# Patient Record
Sex: MALE | Race: WHITE | NOT HISPANIC OR LATINO | Employment: FULL TIME | URBAN - METROPOLITAN AREA
[De-identification: names, ages, dates, MRNs, and addresses within clinical notes are randomized per-mention and may not be internally consistent; named-entity substitution may affect disease eponyms.]

---

## 2024-07-02 ENCOUNTER — TELEPHONE (OUTPATIENT)
Dept: BEHAVIORAL HEALTH | Facility: CLINIC | Age: 54
End: 2024-07-02

## 2024-07-02 ENCOUNTER — HOSPITAL ENCOUNTER (EMERGENCY)
Facility: CLINIC | Age: 54
Discharge: HOME | End: 2024-07-05
Attending: EMERGENCY MEDICINE | Admitting: EMERGENCY MEDICINE
Payer: COMMERCIAL

## 2024-07-02 DIAGNOSIS — G47.00 INSOMNIA, UNSPECIFIED TYPE: Primary | ICD-10-CM

## 2024-07-02 DIAGNOSIS — F22 PARANOIA (H): ICD-10-CM

## 2024-07-02 DIAGNOSIS — F39 MOOD DISORDER (H): ICD-10-CM

## 2024-07-02 LAB
ALBUMIN SERPL BCG-MCNC: 4.8 G/DL (ref 3.5–5.2)
ALP SERPL-CCNC: 77 U/L (ref 40–150)
ALT SERPL W P-5'-P-CCNC: 23 U/L (ref 0–70)
AMPHETAMINES UR QL SCN: NORMAL
ANION GAP SERPL CALCULATED.3IONS-SCNC: 13 MMOL/L (ref 7–15)
AST SERPL W P-5'-P-CCNC: 24 U/L (ref 0–45)
BARBITURATES UR QL SCN: NORMAL
BASOPHILS # BLD AUTO: 0 10E3/UL (ref 0–0.2)
BASOPHILS NFR BLD AUTO: 0 %
BENZODIAZ UR QL SCN: NORMAL
BILIRUB SERPL-MCNC: 0.8 MG/DL
BUN SERPL-MCNC: 19.1 MG/DL (ref 6–20)
BZE UR QL SCN: NORMAL
CALCIUM SERPL-MCNC: 10.3 MG/DL (ref 8.6–10)
CANNABINOIDS UR QL SCN: NORMAL
CHLORIDE SERPL-SCNC: 101 MMOL/L (ref 98–107)
CREAT SERPL-MCNC: 0.95 MG/DL (ref 0.67–1.17)
DEPRECATED HCO3 PLAS-SCNC: 26 MMOL/L (ref 22–29)
EGFRCR SERPLBLD CKD-EPI 2021: >90 ML/MIN/1.73M2
EOSINOPHIL # BLD AUTO: 0.1 10E3/UL (ref 0–0.7)
EOSINOPHIL NFR BLD AUTO: 1 %
ERYTHROCYTE [DISTWIDTH] IN BLOOD BY AUTOMATED COUNT: 12.2 % (ref 10–15)
FENTANYL UR QL: NORMAL
GLUCOSE SERPL-MCNC: 96 MG/DL (ref 70–99)
HCT VFR BLD AUTO: 46.2 % (ref 40–53)
HGB BLD-MCNC: 16 G/DL (ref 13.3–17.7)
IMM GRANULOCYTES # BLD: 0.2 10E3/UL
IMM GRANULOCYTES NFR BLD: 1 %
LYMPHOCYTES # BLD AUTO: 1.8 10E3/UL (ref 0.8–5.3)
LYMPHOCYTES NFR BLD AUTO: 14 %
MCH RBC QN AUTO: 30 PG (ref 26.5–33)
MCHC RBC AUTO-ENTMCNC: 34.6 G/DL (ref 31.5–36.5)
MCV RBC AUTO: 87 FL (ref 78–100)
MONOCYTES # BLD AUTO: 1.1 10E3/UL (ref 0–1.3)
MONOCYTES NFR BLD AUTO: 9 %
NEUTROPHILS # BLD AUTO: 9.1 10E3/UL (ref 1.6–8.3)
NEUTROPHILS NFR BLD AUTO: 75 %
NRBC # BLD AUTO: 0 10E3/UL
NRBC BLD AUTO-RTO: 0 /100
OPIATES UR QL SCN: NORMAL
PCP QUAL URINE (ROCHE): NORMAL
PLATELET # BLD AUTO: 288 10E3/UL (ref 150–450)
POTASSIUM SERPL-SCNC: 4.3 MMOL/L (ref 3.4–5.3)
PROT SERPL-MCNC: 7.5 G/DL (ref 6.4–8.3)
RBC # BLD AUTO: 5.34 10E6/UL (ref 4.4–5.9)
SODIUM SERPL-SCNC: 140 MMOL/L (ref 135–145)
TSH SERPL DL<=0.005 MIU/L-ACNC: 3.72 UIU/ML (ref 0.3–4.2)
WBC # BLD AUTO: 12.2 10E3/UL (ref 4–11)

## 2024-07-02 PROCEDURE — 250N000013 HC RX MED GY IP 250 OP 250 PS 637: Performed by: EMERGENCY MEDICINE

## 2024-07-02 PROCEDURE — 82040 ASSAY OF SERUM ALBUMIN: CPT | Performed by: EMERGENCY MEDICINE

## 2024-07-02 PROCEDURE — 36415 COLL VENOUS BLD VENIPUNCTURE: CPT | Performed by: EMERGENCY MEDICINE

## 2024-07-02 PROCEDURE — 99285 EMERGENCY DEPT VISIT HI MDM: CPT | Performed by: EMERGENCY MEDICINE

## 2024-07-02 PROCEDURE — 85025 COMPLETE CBC W/AUTO DIFF WBC: CPT | Performed by: EMERGENCY MEDICINE

## 2024-07-02 PROCEDURE — 84443 ASSAY THYROID STIM HORMONE: CPT | Performed by: EMERGENCY MEDICINE

## 2024-07-02 PROCEDURE — 99284 EMERGENCY DEPT VISIT MOD MDM: CPT | Performed by: EMERGENCY MEDICINE

## 2024-07-02 PROCEDURE — 80307 DRUG TEST PRSMV CHEM ANLYZR: CPT | Performed by: EMERGENCY MEDICINE

## 2024-07-02 RX ORDER — OLANZAPINE 10 MG/1
10 TABLET, ORALLY DISINTEGRATING ORAL ONCE
Status: COMPLETED | OUTPATIENT
Start: 2024-07-02 | End: 2024-07-02

## 2024-07-02 RX ADMIN — OLANZAPINE 10 MG: 10 TABLET, ORALLY DISINTEGRATING ORAL at 23:01

## 2024-07-02 ASSESSMENT — ACTIVITIES OF DAILY LIVING (ADL)
ADLS_ACUITY_SCORE: 35
ADLS_ACUITY_SCORE: 35
ADLS_ACUITY_SCORE: 33

## 2024-07-02 ASSESSMENT — COLUMBIA-SUICIDE SEVERITY RATING SCALE - C-SSRS
6. HAVE YOU EVER DONE ANYTHING, STARTED TO DO ANYTHING, OR PREPARED TO DO ANYTHING TO END YOUR LIFE?: NO
2. HAVE YOU ACTUALLY HAD ANY THOUGHTS OF KILLING YOURSELF IN THE PAST MONTH?: NO
1. IN THE PAST MONTH, HAVE YOU WISHED YOU WERE DEAD OR WISHED YOU COULD GO TO SLEEP AND NOT WAKE UP?: NO

## 2024-07-03 ENCOUNTER — TELEPHONE (OUTPATIENT)
Dept: BEHAVIORAL HEALTH | Facility: CLINIC | Age: 54
End: 2024-07-03
Payer: COMMERCIAL

## 2024-07-03 PROCEDURE — 250N000013 HC RX MED GY IP 250 OP 250 PS 637: Performed by: EMERGENCY MEDICINE

## 2024-07-03 PROCEDURE — 250N000013 HC RX MED GY IP 250 OP 250 PS 637

## 2024-07-03 PROCEDURE — 99284 EMERGENCY DEPT VISIT MOD MDM: CPT

## 2024-07-03 RX ORDER — LORAZEPAM 1 MG/1
1 TABLET ORAL ONCE
Status: COMPLETED | OUTPATIENT
Start: 2024-07-03 | End: 2024-07-03

## 2024-07-03 RX ORDER — OLANZAPINE 10 MG/1
10 TABLET, ORALLY DISINTEGRATING ORAL 2 TIMES DAILY PRN
Status: DISCONTINUED | OUTPATIENT
Start: 2024-07-03 | End: 2024-07-03

## 2024-07-03 RX ORDER — ACETAMINOPHEN 325 MG/1
650 TABLET ORAL EVERY 4 HOURS PRN
Status: DISCONTINUED | OUTPATIENT
Start: 2024-07-03 | End: 2024-07-05 | Stop reason: HOSPADM

## 2024-07-03 RX ORDER — OLANZAPINE 10 MG/1
10 TABLET, ORALLY DISINTEGRATING ORAL AT BEDTIME
Status: DISCONTINUED | OUTPATIENT
Start: 2024-07-03 | End: 2024-07-05 | Stop reason: HOSPADM

## 2024-07-03 RX ORDER — QUETIAPINE FUMARATE 100 MG/1
100 TABLET, FILM COATED ORAL 2 TIMES DAILY
Status: DISCONTINUED | OUTPATIENT
Start: 2024-07-03 | End: 2024-07-03

## 2024-07-03 RX ORDER — IBUPROFEN 600 MG/1
600 TABLET, FILM COATED ORAL EVERY 6 HOURS PRN
Status: DISCONTINUED | OUTPATIENT
Start: 2024-07-03 | End: 2024-07-05 | Stop reason: HOSPADM

## 2024-07-03 RX ORDER — LORAZEPAM 2 MG/1
2 TABLET ORAL ONCE
Status: COMPLETED | OUTPATIENT
Start: 2024-07-03 | End: 2024-07-03

## 2024-07-03 RX ORDER — HYDROXYZINE HYDROCHLORIDE 25 MG/1
25-50 TABLET, FILM COATED ORAL EVERY 6 HOURS PRN
Status: DISCONTINUED | OUTPATIENT
Start: 2024-07-03 | End: 2024-07-05 | Stop reason: HOSPADM

## 2024-07-03 RX ORDER — OLANZAPINE 5 MG/1
5 TABLET ORAL AT BEDTIME
COMMUNITY
Start: 2024-07-02 | End: 2024-07-05

## 2024-07-03 RX ORDER — ZOLPIDEM TARTRATE 5 MG/1
10 TABLET ORAL AT BEDTIME
Status: DISCONTINUED | OUTPATIENT
Start: 2024-07-03 | End: 2024-07-05 | Stop reason: HOSPADM

## 2024-07-03 RX ORDER — ZOLPIDEM TARTRATE 10 MG/1
10 TABLET ORAL
COMMUNITY
Start: 2024-06-19 | End: 2024-07-05

## 2024-07-03 RX ORDER — OLANZAPINE 5 MG/1
5 TABLET, ORALLY DISINTEGRATING ORAL 2 TIMES DAILY
Status: DISCONTINUED | OUTPATIENT
Start: 2024-07-03 | End: 2024-07-03

## 2024-07-03 RX ADMIN — QUETIAPINE FUMARATE 100 MG: 100 TABLET ORAL at 10:56

## 2024-07-03 RX ADMIN — ZOLPIDEM TARTRATE 10 MG: 5 TABLET ORAL at 22:02

## 2024-07-03 RX ADMIN — OLANZAPINE 10 MG: 10 TABLET, ORALLY DISINTEGRATING ORAL at 22:02

## 2024-07-03 RX ADMIN — HYDROXYZINE HYDROCHLORIDE 50 MG: 25 TABLET ORAL at 09:17

## 2024-07-03 RX ADMIN — OLANZAPINE 5 MG: 5 TABLET, ORALLY DISINTEGRATING ORAL at 10:56

## 2024-07-03 RX ADMIN — LORAZEPAM 1 MG: 1 TABLET ORAL at 00:25

## 2024-07-03 RX ADMIN — LORAZEPAM 2 MG: 2 TABLET ORAL at 13:08

## 2024-07-03 NOTE — TELEPHONE ENCOUNTER
R:  [place]  Access Inpatient Bed Call Log 7/3/2024 7:28 AM   Intake has called facilities that have not updated their bed status within the last 12 hours.     ADULTS:     [facility] is posting 0 beds.               [facility] is posting 0 beds. [phone] Per call at 7:30am to APS no beds available.   [facility] is posting 0 beds. [phone]               [facility] is posting 0 beds. [phone]. Per call at 7:33am to [facility] low acuity beds for review.   [facility] is posting 2 beds. [phone]    [facility] (Ages 18-35) is posting 1 beds. Negative COVID test required, no recent or significant aggression, violence, or sexual assault. [phone] Per call at 7:34am to [facility], child, adol, and lucrecia beds are available for review. PT IS NOT AGE APPROPRIATE   [facility] is posting 0 beds. [phone]             [facility] is posting 0 beds. [phone]      [facility] is posting 0 beds. [phone]         Pt remains on work list pending appropriate bed availability.

## 2024-07-03 NOTE — TELEPHONE ENCOUNTER
R: [place]  Access Inpatient Bed Call Log  7/3/24 @ 1:00am   Intake has called facilities that have not updated their bed status within the last 12 hours.     *METRO:  [place] -- [facility]: @ capacity.  [facility]: @ cap per website. 1 ICU bed available. Reporting no reviews overnight.  [place] -- [facility]: @ cap per website. Low acuity  [place] -- [facility]: @ cap per website. Low acuity only.  [place] -- [facility]: @ cap per website.  [facility]: POSTING 2 BEDS.   [facility]: POSTING 1 BED. Ages 18-35, Voluntary only, NO aggression/physical/sexual assault, violence hx or drug abuse, or psychosis. Negative Covid.   [place] -- [facility]: @ cap per website.  [place] -- [facility]: @ cap per website.  [place] -- [facility]: @ cap per website. Do not review overnight.     Pt remains on waitlist pending appropriate placement availability

## 2024-07-03 NOTE — ED TRIAGE NOTES
"  Pt has been struggling with \" sleeping at night, has been pacing, increase anxiety\"   Triage Assessment (Adult)       Row Name 07/02/24 2041          Triage Assessment    Airway WDL WDL        Respiratory WDL    Respiratory WDL WDL        Skin Circulation/Temperature WDL    Skin Circulation/Temperature WDL WDL        Cardiac WDL    Cardiac WDL WDL        Peripheral/Neurovascular WDL    Peripheral Neurovascular WDL WDL        Cognitive/Neuro/Behavioral WDL    Cognitive/Neuro/Behavioral WDL WDL                     "

## 2024-07-03 NOTE — CONSULTS
Ryan Guerrero MRN# [id number]   Age: 53 year old YOB: 1970   Date of Admission to ED: 7/2/2024    In person visit Details:     Patient was assessed and interviewed face-to-face in person with this writer jack. Patient was observed to be able to participate in the assessment as evidenced by verbal consent. Assessment methods included conducting a formal interview with patient, review of medical records, collaboration with medical staff, and obtaining relevant collateral information from family and community providers when available.        Reason for Consult:   This note is being entered to supplement the psychiatry consultation note that was completed on July 02 2024 by the licensed mental health professional [facility]  have reviewed the pertinent clinical details related to their encounter. I am being consulted to offer additional guidance on psychiatric pharmacological interventions        I met with Jj in his room face-to-face by himself he was pleasant and cooperative during assessment and interview currently denied any suicidal ideation or homicidal ideation or self-injury behavior.  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  Jj told me recently he has been struggling to sleep provider put him on Remeron 15 mg increase it to 30 mg which is not effective also outpatient provider started him on trazodone 50 mg increased to 100 mg which is not effective, Jj told me he continues to have this racing thought process.  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  Jj told me his work is very stressful, he told me to manage his stress he daily exercise use yoga but recently nothing seems to work for his current distress and insomnia and anxiety.  Also Jj worried a lot about his  financial situation and nice family.  Jj met psychiatrist through telehealth from [place] which is started him, I asked Jj if he has family history of mental health from his dad or mom he told me only an anxiety and depression no history of bipolar or bipolar depression.  His TSH and his labs remarkable, no history of inpatient mental health unit or no history of substance use disorder Jj denied any visual or auditory hallucination.  jJ is  with Vanessa who I talked to our emergency room after I get the consent from Jj.  Also Jj has a daughter who is a registered nurse [employer] who joined her mom over the phone we discussed about medication benefit and side effect with family and they are free they agreed with Zyprexa 10 mg at bedtime.  Will continue Ambien 10 mg.  Both family and the patient aware of risk and benefit of Zyprexa such as weight gain and hyperlipidemia any EPS.  jJ currently would like to stay in the emergency room for another day since he does not like to be inpatient mental health unit even though his family wanted him to be inpatient mental health unit.  If Ryan want to leave the hospital please provide 30-day supply of Zyprexa 10 mg at bedtime.      There is genetic loading for none known.  Medical history does not appear to be significant.  Substance use does not appear to be playing a contributing role in the patient's presentation.  Patient appears to cope with stress/frustration/emotion by withdrawing.  Stressors include chronic mental health issues, school issues, peer issues, and family dynamics.   I have reviewed the nursing notes. I have reviewed the findings, diagnosis, plan and need for follow up with the patient.         HPI:        Per ED provider Dr. Arango note Ryan Guerrero is a 53 year old male who with no reported previous past medical history who presents to the emergency department today due to difficulty sleeping.  Patient presents  "with his wife who gives some of the history.  Apparently for the past 6 weeks he has had issues with sleeping to the point where he is only sleeping about 2 hours per night.  He is having difficulty falling asleep and staying asleep.  He is awake all night and pacing around.  This is very unusual for him.  He has tried multiple medications which were prescribed through his primary clinic as well as through his sleep specialist and has tried trazodone, mirtazapine, and is currently on 30 mg of Ambien at bedtime which has been ineffective for him.  Patient had an appointment with a psychiatrist via telehealth today and the psychiatrist prescribed another medication for him, unfortunately they do not know what the name of the medication is, he has not yet picked it up and has not yet started it (eventually it was discovered that this was zyprexa).  His wife reports that for the past week, he has gotten progressively worse and is deteriorating to the point where he cannot function.  He has lost about 15 pounds over the past few weeks due to not eating.  He reports severe anxiety, difficulty calming down, and racing thoughts.  He denies any SI, HI, or hallucinations.  Denies any alcohol or drug use.  He states that just prior to these symptoms starting several weeks ago he \"had a full checkup with my primary and all blood work was normal.\"     Patient lives in [place] normally, but again they recently purchased a second home in [place] and they are living here for the summer.  This has been somewhat stressful for him.  He also makes vague references to some family stressors and made reference to a \"bad business transaction with a family member that went south.\"  He does not give further details other than this.     Patient has had significant concern about his work.  He states that he is responsible for a certain project and has fallen behind on this and everybody else has finished theirs which is causing him " significant concern.  He has had significant difficulty concentrating which she is only exacerbating his inability to finish his project.  He is hyper fixated on this.     Evidently today during his telehealth psychiatry visit, this psychiatrist suggested to him that he take short-term disability/FMLA from work and take a medical leave due to his significantly debilitating symptoms.  Patient is very concerned about doing this as the patient does not want to miss work.     Patient came here from [place] today at the encouragement of his adult daughter who is a nurse.       Patient also is reporting that he had an episode of blood in stool today.  He has had issues with hard stools/constipation and did have to strain today.  He noticed a little bit of blood on top of the stool as well as blood on the paper.  He denies any rectal pain and has not had bleeding since that time.  He denies any abdominal pain, nausea, or vomiting.  He wonders if he could have a hemorrhoid that may be causing this for him.  Reports that he had one previous episode of this many years ago and has not had any issues since that time.     Patient denies fevers or chills, nasal congestion or sore throat, no cough.  He does have some anxiety and shortness of breath and sometimes some reflux/heartburn type symptoms.     This part of the medical record was transcribed by JANINE ORNELAS, Medical Scribe, from a dictation done by Pat Arango MD.       Pt has not required locked seclusion or restraints in the past 24 hours to maintain safety, please refer to RN documentation for further details.  Substance use does not appear to be playing a contributing role in the patient's presentation.  Brief Therapeutic Intervention(s):   Provided active listening, unconditional positive regard, and validation. Engaged in cognitive restructuring/ reframing, looked at common cognitive distortions and challenged negative thoughts. *Engaged in guided  discovery, explored patient's perspectives and helped expand them through socratic dialogue. Provided positive reinforcement for progress towards goals, gains in knowledge, and application of skills previously taught.  Engaged in social skills training. Explored and identified early warning signs to anger.        Past Psychiatric History:     None         Substance Use and History:     None         Past Medical History:   PAST MEDICAL HISTORY: No past medical history on file.    PAST SURGICAL HISTORY: No past surgical history on file.            Allergies:     Allergies   Allergen Reactions    Cats     Seasonal Allergies              Medications:   I have reviewed this patient's current medications  Current Facility-Administered Medications   Medication Dose Route Frequency Provider Last Rate Last Admin    acetaminophen (TYLENOL) tablet 650 mg  650 mg Oral Q4H PRN Pat Arango MD        hydrOXYzine HCl (ATARAX) tablet 25-50 mg  25-50 mg Oral Q6H PRN Pat Arango MD   50 mg at 07/03/24 0917    ibuprofen (ADVIL/MOTRIN) tablet 600 mg  600 mg Oral Q6H PRN Pat Arango MD        OLANZapine zydis (zyPREXA) ODT tab 10 mg  10 mg Oral At Bedtime Yung Santana MD        zolpidem (AMBIEN) tablet 10 mg  10 mg Oral At Bedtime Royal Perkins, APRN CNP         Current Outpatient Medications   Medication Sig Dispense Refill    OLANZapine (ZYPREXA) 5 MG tablet Take 5 mg by mouth at bedtime      zolpidem (AMBIEN) 10 MG tablet Take 10 mg by mouth nightly as needed for sleep                Family History:   FAMILY HISTORY: No family history on file.           Social History:   Upbringing: born and raised   Educational History:   Relationships:  Children: **   Current Living Situation:       - Collateral information from the famly/friend: none         PTA Medications:   (Not in a hospital admission)         Allergies:     Allergies   Allergen Reactions    Cats     Seasonal Allergies            Labs:     Recent Results (from the past 48 hour(s))   Urine Drug Screen Panel    Collection Time: 07/02/24  9:39 PM   Result Value Ref Range    Amphetamines Urine Screen Negative Screen Negative    Barbituates Urine Screen Negative Screen Negative    Benzodiazepine Urine Screen Negative Screen Negative    Cannabinoids Urine Screen Negative Screen Negative    Cocaine Urine Screen Negative Screen Negative    Fentanyl Qual Urine Screen Negative Screen Negative    Opiates Urine Screen Negative Screen Negative    PCP Urine Screen Negative Screen Negative   Comprehensive metabolic panel    Collection Time: 07/02/24  9:55 PM   Result Value Ref Range    Sodium 140 135 - 145 mmol/L    Potassium 4.3 3.4 - 5.3 mmol/L    Carbon Dioxide (CO2) 26 22 - 29 mmol/L    Anion Gap 13 7 - 15 mmol/L    Urea Nitrogen 19.1 6.0 - 20.0 mg/dL    Creatinine 0.95 0.67 - 1.17 mg/dL    GFR Estimate >90 >60 mL/min/1.73m2    Calcium 10.3 (H) 8.6 - 10.0 mg/dL    Chloride 101 98 - 107 mmol/L    Glucose 96 70 - 99 mg/dL    Alkaline Phosphatase 77 40 - 150 U/L    AST 24 0 - 45 U/L    ALT 23 0 - 70 U/L    Protein Total 7.5 6.4 - 8.3 g/dL    Albumin 4.8 3.5 - 5.2 g/dL    Bilirubin Total 0.8 <=1.2 mg/dL   TSH with free T4 reflex    Collection Time: 07/02/24  9:55 PM   Result Value Ref Range    TSH 3.72 0.30 - 4.20 uIU/mL   CBC with platelets and differential    Collection Time: 07/02/24  9:55 PM   Result Value Ref Range    WBC Count 12.2 (H) 4.0 - 11.0 10e3/uL    RBC Count 5.34 4.40 - 5.90 10e6/uL    Hemoglobin 16.0 13.3 - 17.7 g/dL    Hematocrit 46.2 40.0 - 53.0 %    MCV 87 78 - 100 fL    MCH 30.0 26.5 - 33.0 pg    MCHC 34.6 31.5 - 36.5 g/dL    RDW 12.2 10.0 - 15.0 %    Platelet Count 288 150 - 450 10e3/uL    % Neutrophils 75 %    % Lymphocytes 14 %    % Monocytes 9 %    % Eosinophils 1 %    % Basophils 0 %    % Immature Granulocytes 1 %    NRBCs per 100 WBC 0 <1 /100    Absolute Neutrophils 9.1 (H) 1.6 - 8.3 10e3/uL    Absolute Lymphocytes 1.8 0.8 - 5.3  "10e3/uL    Absolute Monocytes 1.1 0.0 - 1.3 10e3/uL    Absolute Eosinophils 0.1 0.0 - 0.7 10e3/uL    Absolute Basophils 0.0 0.0 - 0.2 10e3/uL    Absolute Immature Granulocytes 0.2 <=0.4 10e3/uL    Absolute NRBCs 0.0 10e3/uL          Physical and Psychiatric Examination:     BP (!) 138/100   Pulse 81   Temp 97.5  F (36.4  C) (Oral)   Resp 18   Ht 1.854 m (6' 1\")   Wt 78.2 kg (172 lb 4.8 oz)   SpO2 98%   BMI 22.73 kg/m    Weight is 172 lbs 4.8 oz  Body mass index is 22.73 kg/m .    Mental Status Exam:  Appearance: awake, alert  Attitude:  cooperative  Eye Contact:  good  Mood:  sad  and depressed  Affect:  appropriate and in normal range  Speech:  clear, coherent  Language: fluent and intact in English  Psychomotor, Gait, Musculoskeletal:  no evidence of tardive dyskinesia, dystonia, or tics  Thought Process:  logical, linear, and goal oriented  Associations:  no loose associations  Thought Content:  no evidence of suicidal ideation or homicidal ideation and no evidence of psychotic thought  Insight:  limited  Judgement:  limited  Oriented to:  time, person, and place  Attention Span and Concentration:  fair  Recent and Remote Memory:  intact  Fund of Knowledge:  appropriate         Diagnoses:      Mood disorder (H24)  Paranoia (H)         Recommendations:         1.Continue to recommend inpatient psychiatric hospitalizations for further stabilization   2.  Continue Ambien 10 mg, will start Zyprexa 10 mg at bedtime.  Continue hydroxyzine 25 to 50 mg as needed for an anxiety  3.  Consult psychiatry as needed  4.   Refer to psychiatric provider for medication management.    treatment per ED team    - Consulted with [facility]  licensed mental health professional, ED physician Dr. Yung Santana asked if they would like this writer to enter orders in the EHR,  patient's ED RN regarding this case.    Please call [facility] at [phone] if you have follow-up questions or wish to place another consult.  Royal" Gregorio, Psychiatric Nurse practitioner    Attestation:  Time with:  Patient:/Wife and daughter 45 minutes  Treatment Team: 30 Minutes  Chart Review: 40 minutes    Total time spent was 115 minutes. Over 50% of times was spent counseling and coordination of care.    I thank  primary ED provider and extended care team very much for letting me participate in the care of this patient.    I, Royal Perkins, FANTA, APRN, Psychiatric Nurse Practitioner have personally performed an examination of this patient.  I have edited the note to reflect all relevant changes.  I have discussed this patient with the care team on July 3, 2024.  I have reviewed all vitals and laboratory findings.    Disclaimer: This note consists of symbols derived from keyboarding,

## 2024-07-03 NOTE — ED NOTES
Pt Used bathroom once since 2300.  Pt resting calmly in room after Ativan.  Pt had eyes closed. No complaints noted.

## 2024-07-03 NOTE — ED PROVIDER NOTES
"    [facility] EMERGENCY DEPARTMENT ([facility])    7/02/24          History     Chief Complaint   Patient presents with    Panic Attack     Pt has been struggling with \" sleeping at night, has been pacing, increase anxiety\"    Rectal Bleeding     Noted \"fresh blood \" after defecating today\"     HPI  Ryan Guerrero is a 53 year old male who with no reported previous past medical history who presents to the emergency department today due to difficulty sleeping.  Patient presents with his wife who gives some of the history.  Apparently for the past 6 weeks he has had issues with sleeping to the point where he is only sleeping about 2 hours per night.  He is having difficulty falling asleep and staying asleep.  He is awake all night and pacing around.  This is very unusual for him.  He has tried multiple medications which were prescribed through his primary clinic as well as through his sleep specialist and has tried trazodone, mirtazapine, and is currently on 30 mg of Ambien at bedtime which has been ineffective for him.  Patient had an appointment with a psychiatrist via telehealth today and the psychiatrist prescribed another medication for him, unfortunately they do not know what the name of the medication is, he has not yet picked it up and has not yet started it (eventually it was discovered that this was zyprexa).  His wife reports that for the past week, he has gotten progressively worse and is deteriorating to the point where he cannot function.  He has lost about 15 pounds over the past few weeks due to not eating.  He reports severe anxiety, difficulty calming down, and racing thoughts.  He denies any SI, HI, or hallucinations.  Denies any alcohol or drug use.  He states that just prior to these symptoms starting several weeks ago he \"had a full checkup with my primary and all blood work was normal.\"    Patient lives in [place] normally, but again they recently purchased a second home in [place] and " "they are living here for the summer.  This has been somewhat stressful for him.  He also makes vague references to some family stressors and made reference to a \"bad business transaction with a family member that went south.\"  He does not give further details other than this.    Patient has had significant concern about his work.  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  He has had significant difficulty concentrating which she is only exacerbating his inability to finish his project.  He is hyper fixated on this.    Evidently today during his telehealth psychiatry visit, this psychiatrist suggested to him that he take short-term disability/FMLA from work and take a medical leave due to his significantly debilitating symptoms.  Patient is very concerned about doing this as the patient does not want to miss work.    Patient came here from [place] today at the encouragement of his adult daughter who is a nurse.      Patient also is reporting that he had an episode of blood in stool today.  He has had issues with hard stools/constipation and did have to strain today.  He noticed a little bit of blood on top of the stool as well as blood on the paper.  He denies any rectal pain and has not had bleeding since that time.  He denies any abdominal pain, nausea, or vomiting.  He wonders if he could have a hemorrhoid that may be causing this for him.  Reports that he had one previous episode of this many years ago and has not had any issues since that time.    Patient denies fevers or chills, nasal congestion or sore throat, no cough.  He does have some anxiety and shortness of breath and sometimes some reflux/heartburn type symptoms.    This part of the medical record was transcribed by JANINE ORNELAS, Medical Scribe, from a dictation done by Pat Arango MD.       Past Medical History  No " "past medical history on file.  No past surgical history on file.  Zolpidem Tartrate (AMBIEN PO)      Allergies   Allergen Reactions    Cats     Seasonal Allergies      Family History  No family history on file.  Social History       Past medical history, past surgical history, medications, allergies, family history, and social history were reviewed with the patient. No additional pertinent items.   A complete review of systems was performed with pertinent positives and negatives noted in the HPI, and all other systems negative.    Physical Exam   BP: (!) 167/96  Pulse: 60  Temp: 97.6  F (36.4  C)  Resp: 18  Height: 185.4 cm (6' 1\")  Weight: 78.2 kg (172 lb 4.8 oz)  SpO2: 99 %  Physical Exam  Vitals and nursing note reviewed.   Constitutional:       General: He is in acute distress.      Appearance: He is not diaphoretic.      Comments: Anxious thin adult male, constantly fidgeting and pacing, distressed   HENT:      Head: Atraumatic.      Mouth/Throat:      Mouth: Mucous membranes are moist.      Pharynx: Oropharynx is clear. No oropharyngeal exudate.   Eyes:      General: No scleral icterus.     Pupils: Pupils are equal, round, and reactive to light.   Cardiovascular:      Rate and Rhythm: Normal rate.      Pulses: Normal pulses.      Heart sounds: No murmur heard.  Pulmonary:      Effort: No respiratory distress.      Breath sounds: Normal breath sounds.   Abdominal:      General: Bowel sounds are normal.      Palpations: Abdomen is soft.      Tenderness: There is no abdominal tenderness.   Musculoskeletal:         General: No tenderness.   Skin:     General: Skin is warm.      Findings: No rash.   Neurological:      General: No focal deficit present.   Psychiatric:         Mood and Affect: Mood is anxious.         Speech: Speech is rapid and pressured.      Comments: Very anxious, pacing, fidgeting, +psychomotor agitation. Pressured speech. Normal volume of speech. Good eye contact. No SI/HI. Insight limited. " Seems fairly paranoid.  Remarkable only for minimal leukocytosis with a white count of 12.2, otherwise within normal limits.         ED Course, Procedures, & Data      Procedures               Results for orders placed or performed during the hospital encounter of 07/02/24   Urine Drug Screen Panel     Status: Normal   Result Value Ref Range    Amphetamines Urine Screen Negative Screen Negative    Barbituates Urine Screen Negative Screen Negative    Benzodiazepine Urine Screen Negative Screen Negative    Cannabinoids Urine Screen Negative Screen Negative    Cocaine Urine Screen Negative Screen Negative    Fentanyl Qual Urine Screen Negative Screen Negative    Opiates Urine Screen Negative Screen Negative    PCP Urine Screen Negative Screen Negative   Comprehensive metabolic panel     Status: Abnormal   Result Value Ref Range    Sodium 140 135 - 145 mmol/L    Potassium 4.3 3.4 - 5.3 mmol/L    Carbon Dioxide (CO2) 26 22 - 29 mmol/L    Anion Gap 13 7 - 15 mmol/L    Urea Nitrogen 19.1 6.0 - 20.0 mg/dL    Creatinine 0.95 0.67 - 1.17 mg/dL    GFR Estimate >90 >60 mL/min/1.73m2    Calcium 10.3 (H) 8.6 - 10.0 mg/dL    Chloride 101 98 - 107 mmol/L    Glucose 96 70 - 99 mg/dL    Alkaline Phosphatase 77 40 - 150 U/L    AST 24 0 - 45 U/L    ALT 23 0 - 70 U/L    Protein Total 7.5 6.4 - 8.3 g/dL    Albumin 4.8 3.5 - 5.2 g/dL    Bilirubin Total 0.8 <=1.2 mg/dL   TSH with free T4 reflex     Status: Normal   Result Value Ref Range    TSH 3.72 0.30 - 4.20 uIU/mL   CBC with platelets and differential     Status: Abnormal   Result Value Ref Range    WBC Count 12.2 (H) 4.0 - 11.0 10e3/uL    RBC Count 5.34 4.40 - 5.90 10e6/uL    Hemoglobin 16.0 13.3 - 17.7 g/dL    Hematocrit 46.2 40.0 - 53.0 %    MCV 87 78 - 100 fL    MCH 30.0 26.5 - 33.0 pg    MCHC 34.6 31.5 - 36.5 g/dL    RDW 12.2 10.0 - 15.0 %    Platelet Count 288 150 - 450 10e3/uL    % Neutrophils 75 %    % Lymphocytes 14 %    % Monocytes 9 %    % Eosinophils 1 %    % Basophils 0 %     % Immature Granulocytes 1 %    NRBCs per 100 WBC 0 <1 /100    Absolute Neutrophils 9.1 (H) 1.6 - 8.3 10e3/uL    Absolute Lymphocytes 1.8 0.8 - 5.3 10e3/uL    Absolute Monocytes 1.1 0.0 - 1.3 10e3/uL    Absolute Eosinophils 0.1 0.0 - 0.7 10e3/uL    Absolute Basophils 0.0 0.0 - 0.2 10e3/uL    Absolute Immature Granulocytes 0.2 <=0.4 10e3/uL    Absolute NRBCs 0.0 10e3/uL   Urine Drug Screen     Status: Normal    Narrative    The following orders were created for panel order Urine Drug Screen.  Procedure                               Abnormality         Status                     ---------                               -----------         ------                     Urine Drug Screen Panel[[id number]]      Normal              Final result                 Please view results for these tests on the individual orders.   CBC with Platelets & Differential     Status: Abnormal    Narrative    The following orders were created for panel order CBC with Platelets & Differential.  Procedure                               Abnormality         Status                     ---------                               -----------         ------                     CBC with platelets and d...[[id number]]  Abnormal            Final result                 Please view results for these tests on the individual orders.     Medications   OLANZapine zydis (zyPREXA) ODT tab 10 mg (10 mg Oral $Given 7/2/24 1051)     Labs Ordered and Resulted from Time of ED Arrival to Time of ED Departure   COMPREHENSIVE METABOLIC PANEL - Abnormal       Result Value    Sodium 140      Potassium 4.3      Carbon Dioxide (CO2) 26      Anion Gap 13      Urea Nitrogen 19.1      Creatinine 0.95      GFR Estimate >90      Calcium 10.3 (*)     Chloride 101      Glucose 96      Alkaline Phosphatase 77      AST 24      ALT 23      Protein Total 7.5      Albumin 4.8      Bilirubin Total 0.8     CBC WITH PLATELETS AND DIFFERENTIAL - Abnormal    WBC Count 12.2 (*)     RBC Count  5.34      Hemoglobin 16.0      Hematocrit 46.2      MCV 87      MCH 30.0      MCHC 34.6      RDW 12.2      Platelet Count 288      % Neutrophils 75      % Lymphocytes 14      % Monocytes 9      % Eosinophils 1      % Basophils 0      % Immature Granulocytes 1      NRBCs per 100 WBC 0      Absolute Neutrophils 9.1 (*)     Absolute Lymphocytes 1.8      Absolute Monocytes 1.1      Absolute Eosinophils 0.1      Absolute Basophils 0.0      Absolute Immature Granulocytes 0.2      Absolute NRBCs 0.0     URINE DRUG SCREEN PANEL - Normal    Amphetamines Urine Screen Negative      Barbituates Urine Screen Negative      Benzodiazepine Urine Screen Negative      Cannabinoids Urine Screen Negative      Cocaine Urine Screen Negative      Fentanyl Qual Urine Screen Negative      Opiates Urine Screen Negative      PCP Urine Screen Negative     TSH WITH FREE T4 REFLEX - Normal    TSH 3.72       No orders to display          Critical care was not performed.     Medical Decision Making  The patient's presentation was of high complexity (new to subacute undiagnosed problem causing significant debility).    The patient's evaluation involved:  an assessment requiring an independent historian (see separate area of note for details)  ordering and/or review of 3+ test(s) in this encounter (see separate area of note for details)  discussion of management or test interpretation with another health professional (see separate area of note for details)    The patient's management necessitated high risk (a decision regarding hospitalization).    Assessment & Plan    Patient presents with the above complaints.  On my evaluation, patient is alert, highly anxious, very fidgety, psychomotor agitation is evident.  He is cooperative, has normal eye contact, somewhat pressured speech.    I do have some suspicion for possible first presentation of bipolar disorder, though this is a little bit unusual in this age group, that can happen.  We did do basic  labs, CBC is remarkable for mild leukocytosis with a white count of 12.2, hemoglobin is 16.0, platelets are normal at 288, CMP within normal limits with the exception of minimal hypercalcemia with a calcium of 10.3, TSH within normal limits at 3.72, UDS is negative.  Suspect that the 1 episode of rectal bleeding that he had is probably due to hemorrhoids.  Labs are reassuring.      Patient was seen by the DEC , please see her note for full details.      I do believe that he would benefit from inpatient hospitalization for appropriate psychiatric evaluation, medication management.  Patient is significantly debilitated from his symptoms.  He was very reluctant to be admitted, but with his son and daughter here, they were able to convince him that they believe that this would be in his best interest.  Patient ultimately was agreeable to admission.  He is not holdable at this time, but I strongly encourage that he continue with the plan of care for admission.  He is agreeable to be [place], but does not want to consider admission to the [facility] region.  I have ordered Zyprexa for him which will hopefully help him sleep.  I have also placed orders for psychiatry consult in the morning.    I have reviewed the nursing notes. I have reviewed the findings, diagnosis, plan and need for follow up with the patient.    New Prescriptions    No medications on file       Final diagnoses:   Mood disorder (H24)   Paranoia (H)   IJANINE, am serving as a trained medical scribe to document services personally performed by Pat Arango MD, based on the provider's statements to me.     Pat MOORE MD, was physically present and have reviewed and verified the accuracy of this note documented by JANINE ORNELAS.     Pat Arango MD.   [facility] EMERGENCY DEPARTMENT  7/2/2024     Pat Arango MD  07/03/24 0020

## 2024-07-03 NOTE — MEDICATION SCRIBE - ADMISSION MEDICATION HISTORY
Medication Scribe Admission Medication History    Admission medication history is complete. The information provided in this note is only as accurate as the sources available at the time of the update.    Information Source(s): Patient, Hospital records, and CareEverywhere/SureScripts via in-person    Pertinent Information: None.    Changes made to PTA medication list:  Added: Olanzapine 5 mg at bedtime PRN.  Deleted: None  Changed: None    Allergies reviewed with patient and updates made in EHR: yes    Medication History Completed By: Paulie Hurd MD 7/3/2024 11:05 AM    PTA Med List   Medication Sig Last Dose    OLANZapine (ZYPREXA) 5 MG tablet Take 5 mg by mouth at bedtime 7/2/2024 at hs    zolpidem (AMBIEN) 10 MG tablet Take 10 mg by mouth nightly as needed for sleep Past Week at hs

## 2024-07-03 NOTE — PLAN OF CARE
Ryan Guerrero  July 2, 2024  Plan of Care Hand-off Note     Patient Care Path: inpatient mental health    Plan for Care:     It is the recommendation of this clinician that pt admit to IP MH for safety and stabilization. 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 The ED MD and family prefer MH admission.  Patient is voluntary, but fearful and paranoid about it.  He's afraid to be away from work, as well. Pt is unable to engage in safety planning to mitigate risk level in a non-secure setting. Lower levels of care have not been successful in mitigating risk. Due to this IP is the least restrictive option of care for pt. Pt should remain in IP until deemed safe to return to the community and engage in OP MH supports. Pt will be able to resume work with established providers upon discharge.      Identified Goals and Safety Issues: Patient's mental health has been increasingly decompensating.  He will benefit from assistance with sleep and possible cognitive behavioral tools, as well as; further assessment, observation, and stabilization.  He is not aggressive.    Overview:  Steph Guerrero, spouse, [phone] Ugo Guerrero, daughter, [phone]      Legal Status: Legal Status at Admission: Voluntary/Patient has signed consent for treatment    Psychiatry Consult:  Yes       Updated   regarding plan of care.           Rosanne Haji, [facility] Hospital[facility]

## 2024-07-03 NOTE — TELEPHONE ENCOUNTER
S: [facility] , DEC  Rosanne  calling at 11:10pm about a 53 year old/Male presenting with concerns of onset Bipolar and hypomania.      B: Pt arrived via Family. Presenting problem, stressors: Pt has been sleeping 2-4 hrs daily for the past 6 weeks.  He has increase in anxiety and depression.  He lost 15-20lbs in last 6 weeks.     Pt affect in ED: Anxious  and Paranoid, not aggressive. Mild agitation.   Pt Dx: Major Depressive Disorder and Generalized Anxiety Disorder  Previous IPMH hx? No  Pt denies SI   Hx of suicide attempt? No  Pt denies SIB  Pt denies HI   Pt denies hallucinations .   Pt RARS Score: 3    Hx of aggression/violence, sexual offenses, legal concerns, Epic care plan? describe: No  Current concerns for aggression this visit? No  Does pt have a history of Civil Commitment? No  Is Pt their own guardian? Yes    Pt is prescribed medication. Is patient medication compliant? Yes and not sure- he has been fearful of taking them.  Concern of not taking meds.  Recently started trial of sleep meds.   Pt endorses OP services: Psychiatrist  CD concerns: None  Acute or chronic medical concerns: No  Does Pt present with specific needs, assistive devices, or exclusionary criteria? None      Pt is ambulatory  Pt is able to perform ADLs independently      A: Pt to be reviewed for [facility] admission. Pt is Voluntary  [facility]: [facility]    COVID Symptoms: No  If yes, COVID test required   Utox: Negative   CMP: WNL  CBC: WNL  HCG: N/A    R: Patient cleared and ready for behavioral bed placement: Yes  Pt placed on IP worklist? Yes    Does Patient need a Transfer Center request created? No, Pt is located within [facility] ED, [facility] ED, or [place] ED

## 2024-07-03 NOTE — CONSULTS
"Diagnostic Evaluation Consultation  Crisis Assessment    Patient Name: Ryan Guerrero  Age:  53 year old  Legal Sex: male  Gender Identity: male  Pronouns:   Race: White  Ethnicity: Not  or   Language: English      Patient was assessed: In person   Crisis Assessment Start Date: 07/02/24  Crisis Assessment Start Time: 2200  Crisis Assessment Stop Time: 2230  Patient location: [facility] EMERGENCY DEPARTMENT                             ED05    Referral Data and Chief Complaint  Ryan Guerrero presents to the ED with family/friends. Patient is presenting to the ED for the following concerns: Paranoia, Depression, Anxiety, Worsening psychosocial stress, Health stressors.   Factors that make the mental health crisis life threatening or complex are:  Patient was brought in by his wife, his adult daughter (who is a nurse), and his adult son due to high anxiety, insomnia, increasing depression, and paranoia.  He has been decompensating, over the last days.  He's alert and oriented x 5.  He's highly anxious but cooperative, with some redirection.  He's not aggressive.  He denied SI, NSSI, and HI.  He denied prior suicide attempts.  He denied having hallucinations and/or delusional thoughts.  He denied excessive spending, high risk behavior, gambling, and hypersexuality.  He is hyper-fixated on what he sees as a work failure and conflict with family about bad business.  He said, \"there's a work project.  Everyone else has finished their portion, but me.\"  He's lost 15-20 pounds in the last couple months.  He's been sleeping 2-4 hours per night; despite being prescribed Trazodone, then Mirtazipine, and then Ambien.  Those doses were doubled, after prescribed, yet he still has not slept much.  They were prescribed by a primary doctor and a sleep doctor.  Today, he talked to a Tele-Psychiatrist who prescribed Olanzapine.  He has a follow up visit with him in one week.  He's worried that the " Psychiatrist will drop him.  He's been in a panic, pacing, fidgety, shaky, loss of sleep, loss of memory, withdrawal, loss of interest, and not engaging in usual activities.  He was encouraged to take a medical or mental health leave and to go on short-term disability, but he could not perform the tasks necessary in order to accomplish this.  His insight, judgment, and impulse control were impaired.    Informed Consent and Assessment Methods  Explained the crisis assessment process, including applicable information disclosures and limits to confidentiality, assessed understanding of the process, and obtained consent to proceed with the assessment.  Assessment methods included conducting a formal interview with patient, review of medical records, collaboration with medical staff, and obtaining relevant collateral information from family and community providers when available.  : done     Patient response to interventions: acceptance expressed  Coping skills were attempted to reduce the crisis:  Pt has been seeking help from outpatient providers, but his symptoms continue to increase     History of the Crisis   Patient denied prior mental health diagnosis.  He has a Depression diagnosis on his record from [place].  He has not been in the ED for MH concerns, before.  He has not been admitted to an inpatient MH unit, before.  He has not sought out mental health providers, prior to this mental health crisis.  He's his own guardian.  He's not on civil commitment.    Brief Psychosocial History  Family:  , Children yes (Adult son and adult daughter)  Support System:  Wife, Children  Employment Status:  employed full-time  Source of Income:  salary/wages  Financial Environmental Concerns:  other (see comments) (increasing concerns about finances)  Current Hobbies:  family functions, exercise/fitness, meditation, outdoor activities, interaction with pets  Barriers in Personal Life:  mental health concerns  Patient  "lives with his wife of 30 years in [place].  They recently bought a cabin in [place] and they've been staying there for the last 6 weeks.  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  He has a bachelors in finance.  He named the following coping skills and things he's enjoyed:  exercise, meditation, time outdoors, walking the dog, and time with his children.    Significant Clinical History  Current Anxiety Symptoms:  panic attack, obsessions/compulsions, racing thoughts, excessive worry, shortness of breath or racing heart, anxious  Current Depression/Trauma:  avoidance, sense of doom, difficulty concentrating, withdrawl/isolation, negativistic, crying or feels like crying, impaired decision making, helplessness, hopelessness, sadness, excessive guilt  Current Somatic Symptoms:  sweating, flushing, shaking, somatic symptoms (abdominal pain, headache, tension), racing thoughts, excessive worry, shortness of breath or racing heart, anxious  Current Psychosis/Thought Disturbance:  impulsive, inattentive, hyperactive, agitation, distractability  Current Eating Symptoms:  loss of appetite, recent weight loss  Chemical Use History:  Alcohol: None  Benzodiazepines: None  Opiates: None  Cocaine: None  Marijuana: None  Other Use: None   Past diagnosis:  Depression, Anxiety Disorder  Family history:  No known history of mental health or chemical health concerns  Past treatment:  Individual therapy, Primary Care, Psychiatric Medication Management  Details of most recent treatment:  Patient talked to an \"MSN counselor/therapist\" 2-3 times in the last 3 weeks and he just talked to a Psychiatrist, today.  Other relevant history:  Patient lives in [place] with his wife, daughter, and son.  They recently purchased a cabin in [place], [place].  His daughter preferred to have him come to [place] for MH concerns, rather than go to an ED in [facility].     Collateral " "Information  Is there collateral information:       Collateral information name, relationship, phone number:   Ugo Guerrero, daughter, [phone]    What happened today: Pt wanted daughter to stay in room and daughter said pt has been a poor self-report because of his mental health decompensation. He's over-fixated on work and fear that the company is failing because of him.  He spent 15 hours on the computer related to work, but he didn't get anything accomplished.        What is different about patient's functioning:  \"This is not my dad, at all.  We're all so worried about him.\"   Pt has been getting worse and worse, over the last 6 weeks.      Concern about alcohol/drug use:  No    What do you think the patient needs:  admission    Has patient made comments about wanting to kill themselves/others:  No    If d/c is recommended, can they take part in safety/aftercare planning:  Yes     Additional collateral information:        Risk Assessment  [place] Suicide Severity Rating Scale Full Clinical Version:  Suicidal Ideation  Q1 Wish to be Dead (Lifetime): No  Q2 Non-Specific Active Suicidal Thoughts (Lifetime): No  Q6 Suicide Behavior (Lifetime): no     Suicidal Behavior (Lifetime)  Actual Attempt (Lifetime): No  Has subject engaged in non-suicidal self-injurious behavior? (Lifetime): No  Interrupted Attempts (Lifetime): No  Aborted or Self-Interrupted Attempt (Lifetime): No  Preparatory Acts or Behavior (Lifetime): No    [place] Suicide Severity Rating Scale Recent:   Suicidal Ideation (Recent)  Q1 Wished to be Dead (Past Month): no  Q2 Suicidal Thoughts (Past Month): no  Level of Risk per Screen: no risks indicated  Intensity of Ideation (Recent)  Description of Most Severe Ideation (Past 1 Month): \"I'm not suicidal.\"  Suicidal Behavior (Recent)  Actual Attempt (Past 3 Months): No  Has subject engaged in non-suicidal self-injurious behavior? (Past 3 Months): No  Interrupted Attempts (Past 3 Months): " No  Aborted or Self-Interrupted Attempt (Past 3 Months): No  Preparatory Acts or Behavior (Past 3 Months): No    Environmental or Psychosocial Events: challenging interpersonal relationships, other life stressors, helplessness/hopelessness, work or task failure  Protective Factors: Protective Factors: strong bond to family unit, community support, or employment, intact marriage or domestic partnership, responsibilities and duties to others, including pets and children, lives in a responsibly safe and stable environment, help seeking    Does the patient have thoughts of harming others? Feels Like Hurting Others: no  Previous Attempt to Hurt Others: no  Is the patient engaging in sexually inappropriate behavior?: no    Is the patient engaging in sexually inappropriate behavior?  no        Mental Status Exam   Affect: Flat, Constricted  Appearance: Appropriate  Attention Span/Concentration: Attentive, Inattentive  Eye Contact: Engaged, Intense    Fund of Knowledge: Appropriate   Language /Speech Content: Fluent  Language /Speech Volume: Normal, Soft  Language /Speech Rate/Productions: Minimally Responsive, Pressured, Normal  Recent Memory: Variable  Remote Memory: Variable  Mood: Anxious, Depressed (Patient appears to be fearful)  Orientation to Person: Yes   Orientation to Place: Yes  Orientation to Time of Day: Yes  Orientation to Date: Yes     Situation (Do they understand why they are here?): Yes  Psychomotor Behavior: Hyperactive  Thought Content: Paranoia  Thought Form: Goal Directed, Paranoia, Obsessive/Perseverative     Medication  Psychotropic medications: Olanzapine  Medication Orders - Psychiatric (From admission, onward)      None             Current Care Team  Patient Care Team:  System, Provider Not In as PCP - General (Clinic)    Diagnosis  Patient Active Problem List   Diagnosis Code    Paranoia (H) F22    Mood disorder (H24) F39       Primary Problem This Admission  Active Hospital Problems     *Paranoia (H)      Mood disorder (H24)        Clinical Summary and Substantiation of Recommendations      It is the recommendation of this clinician that pt admit to IP MH for safety and stabilization. 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 Pt is unable to engage in safety planning to mitigate risk level in a non-secure setting. Lower levels of care have not been successful in mitigating risk. Due to this IP is the least restrictive option of care for pt. Pt should remain in IP until deemed safe to return to the community and engage in [facility] supports. Pt will be able to resume work with established providers upon discharge.         Severe psychiatric, behavioral or other comorbid conditions are appropriate for management at inpatient mental health as indicated by at least one of the following: Psychiatric Symptoms, Impaired impulse control, judgement, or insight, Symptoms of impact to function  Severe dysfunction in daily living is present as indicated by at least one of the following: Extreme deterioration in social interactions  Situation and expectations are appropriate for inpatient care: Patient management/treatment at lower level of care is not feasible or is inappropriate  Inpatient mental health services are necessary to meet patient needs and at least one of the following: Specific condition related to admission diagnosis is present and judged likely to deteriorate in absence of treatment at proposed level of care.    Patient coping skills attempted to reduce the crisis:  Pt has been seeking help from outpatient providers, but his symptoms continue to increase    Disposition  Recommended disposition: Inpatient Mental Health        Reviewed case and recommendations with attending provider. Attending Name: Pat Arango MD        Attending concurs with disposition: yes (Dr Arango preferred admission)       Patient and/or validated legal guardian concurs with disposition:   yes (reluctant but voluntary, especially due to adult childrens' preference (especially daughter who is a nurse))       Final disposition:  inpatient mental health    Legal status on admission: Voluntary/Patient has signed consent for treatment    Assessment Details   Total duration spent with the patient: 30 min     CPT code(s) utilized: 86839 - Psychotherapy for Crisis - 60 (30-74*) min    Rosanne Haji [facility], Psychotherapist  [facility] - Triage & Transition Services  Callback: [phone]

## 2024-07-04 ENCOUNTER — TELEPHONE (OUTPATIENT)
Dept: BEHAVIORAL HEALTH | Facility: CLINIC | Age: 54
End: 2024-07-04
Payer: COMMERCIAL

## 2024-07-04 PROCEDURE — 250N000013 HC RX MED GY IP 250 OP 250 PS 637: Performed by: EMERGENCY MEDICINE

## 2024-07-04 PROCEDURE — 250N000013 HC RX MED GY IP 250 OP 250 PS 637

## 2024-07-04 PROCEDURE — 99207 PR NO CHARGE LOS: CPT | Performed by: CLINICAL NURSE SPECIALIST

## 2024-07-04 PROCEDURE — 250N000013 HC RX MED GY IP 250 OP 250 PS 637: Performed by: CLINICAL NURSE SPECIALIST

## 2024-07-04 RX ORDER — CLONIDINE HYDROCHLORIDE 0.1 MG/1
0.1 TABLET, EXTENDED RELEASE ORAL 2 TIMES DAILY
Status: DISCONTINUED | OUTPATIENT
Start: 2024-07-04 | End: 2024-07-05 | Stop reason: HOSPADM

## 2024-07-04 RX ADMIN — HYDROXYZINE HYDROCHLORIDE 50 MG: 25 TABLET ORAL at 17:44

## 2024-07-04 RX ADMIN — CLONIDINE 0.1 MG: 0.1 TABLET, EXTENDED RELEASE ORAL at 19:34

## 2024-07-04 RX ADMIN — ZOLPIDEM TARTRATE 10 MG: 5 TABLET ORAL at 22:06

## 2024-07-04 RX ADMIN — OLANZAPINE 10 MG: 10 TABLET, ORALLY DISINTEGRATING ORAL at 22:06

## 2024-07-04 NOTE — ED NOTES
IP MH Referral Acuity Rating Score (RARS)    LMHP complete at referral to IP MH, with DEC; and, daily while awaiting IP MH placement. Call score to PPS.  CRITERIA SCORING   New 71 HH and Involuntary for IP MH (not adolescent) 0/1   Boarding over 24 hours 1/1   Vulnerable adult at least 55+ with multiple co morbidities; or, Patient age 11 or under 0/1   Suicide ideation without relief of precipitating factors 0/1   Current plan for suicide 0/1   Current plan for homicide 0/1   Imminent risk or actual attempt to seriously harm another without relief of factors precipitating the attempt 0/1   Severe dysfunction in daily living (ex: complete neglect for self care, extreme disruption in vegetative function, extreme deterioration in social interactions) 1/1   Recent (last 2 weeks) or current physical aggression in the ED 0/1   Restraints or seclusion episode in ED 0/1   Verbal aggression, agitation, yelling, etc., while in the ED 0/1   Active psychosis with psychomotor agitation or catatonia 0/1   Need for constant or near constant redirection (from leaving, from others, etc).  0/1   Intrusive or disruptive behaviors 0/1   TOTAL Acuity Total Score: 2

## 2024-07-04 NOTE — PROGRESS NOTES
Triage & Transition Services, Extended Care     Client Name: Ryan Guerrero    Date: July 4, 2024    Service Type: (P) excused from group session  Session Start Time:  (P) 1300    Session End Time: (P) 1300  Session Length: (P) 0  Site Location: [facility] EMERGENCY DEPARTMENT                             [id number]  Total Number ofAttendees: (P) 0  Topic: (P) relaxation techniques   Response: (P)  (Patient had a visitor present during group time.)     JUAN LUIS Dennis   Licensed Mental Health Professional (LMHP), [facility]  [phone]

## 2024-07-04 NOTE — TELEPHONE ENCOUNTER
R:  [place]  Access Inpatient Bed Call Log 7/4/2024 AT 7:45 AM   Intake has called facilities that have not updated their bed status within the last 12 hours.   -METRO  ADULTS:   [facility] is posting 0 beds.               [facility] is posting 0 beds. [phone]   [facility] is posting 0 beds. [phone]               [facility] is posting 0 beds. [phone].   [facility] is posting 2 beds. [phone] Per call at 8:47 am to [place] on DELAY.  [facility] (Ages 18-35) is posting 4 beds. Negative COVID test required, no recent or significant aggression, violence, or sexual assault. [phone] PT NOT APPROPRIATE D/T UNIT RESTRICTIONS.  [facility] is posting 0 beds. [phone]             [facility] is posting 0 beds. [phone]      [facility] through [facility] is posting 0 beds. [phone]         Pt remains on work list pending appropriate bed availability.

## 2024-07-04 NOTE — PROGRESS NOTES
"Triage & Transition Services, Extended Care     Therapy Progress Note    Patient: Ryan goes by \"Ryan,\" uses he/him pronouns  Date of Service: July 4, 2024  Site of Service: [facility] EMERGENCY DEPARTMENT                             [id number]  Patient was seen yes  Mode of Assessment: Virtual: AmWell    Presentation Summary: Pt is seen by extended care for therapeutic check-in and reassessment. Exchanged greeting, introduced self and role. Pt presents as axious, cooperative, alert, oriented x4. Affect and eye contact are appropriate. Thought processes are organized and logical. Pt denies SI, HI, and AVH's. Pt expresses concerns that he has been in the ER for two days and wonders what the plan or next step is. He says he has been able to sleep but overall is not feeling rested due to being in the ER environment. He is feeling like he would be able to sleep more outside of the hospital. 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 He endorses having racing thoughts which are difficult to manage. He describes being \"stir crazy\" because he has been unable to walk around. Pt and his sister are wondering when he is discharged if the hospital staff are able to make referrals to psychiatry and therapy in [place]. States Pt is from [facility] and will be returning there soon.    Therapeutic Intervention(s) Provided: Engaged in safety planning, Engaged in cognitive restructuring/ reframing, looked at common cognitive distortions and challenged negative thoughts., Reviewed healthy living that supports positive mental health, including looking at sleep hygiene, regular movement, nutrition, and regular socialization.    Current Symptoms: racing thoughts, excessive worry, anxious, shortness of breath or racing heart impaired decision making, difficulty concentrating racing thoughts, excessive worry, anxious, shortness of breath or racing heart inattentive      Mental " Status Exam   Affect: Blunted  Appearance: Appropriate  Attention Span/Concentration: Attentive  Eye Contact: Engaged    Fund of Knowledge: Appropriate   Language /Speech Content: Fluent  Language /Speech Volume: Normal  Language /Speech Rate/Productions: Normal  Recent Memory: Intact  Remote Memory: Intact  Mood: Anxious  Orientation to Person: Yes   Orientation to Place: Yes  Orientation to Time of Day: Yes  Orientation to Date: Yes     Situation (Do they understand why they are here?): Yes  Psychomotor Behavior: Normal  Thought Content: Clear  Thought Form: Obsessive/Perseverative, Goal Directed    Treatment Objective(s) Addressed: rapport building, identifying and practicing coping strategies, processing feelings, safety planning, assessing safety    Patient Response to Interventions: acceptance expressed, verbalizes understanding    Progress Towards Goals: Patient Reports Symptoms Are: ongoing  Patient Progress Toward Goals: is making progress  Comment: Patient reports he was able to sleep approximately 7 hours overnight, has been able to nap a bit today.  Continues to experience high levels of anxiety, does report some improvement with lorazepam, does feel somewhat drugged with current dose., has also met with psychiatry today and will start olanzapine with Ambien tonight for sleep.  Next Step to Work Toward Discharge: symptom stabilization  Symptom Stabilization Comment: Reduction of anxiety, improved sleep,    Case Management:      Plan: inpatient mental health  yes RN, provider Consulted with Manuel Villanueva regarding the plan to continue with IP. Pt is not considered to be holdable and is free to leave if he decides.  no    Clinical Substantiation: Pt denies SI, HI, and AVH's.  He continues to experience severe anxiety with paranoia. He has been able to sleep and reports that medications are helping. After therapeutic assessment, intervention and aftercare planning by ED care team and [facility] and in  consultation with the attending provider, the patient's circumstances and mental state were appropriate for inpatient behavioral health. Patient is recommended by this clinician to admit IP MH for safety and stabilization. Pt exhibits impaired ability to social functioning with decreased insight and judgement. Pt is not considered to be holdable and is free to leave if he chooses. He could benefit from assistance with outpatient resources for therapy and psychiatry.    Legal Status: Legal Status at Admission: Voluntary/Patient has signed consent for treatment    Session Status: Time session started: 1031  Time session ended: 1055  Session Duration (minutes): 24 minutes  Session Number: 2  Anticipated number of sessions or this episode of care: 4    Time Spent: 24 minutes    CPT Code: CPT Codes: 88246 - Psychotherapy (with patient) - 30 (16-37*) min    Diagnosis:   Patient Active Problem List   Diagnosis Code    Paranoia (H) F22    Mood disorder (H24) F39       Primary Problem This Admission: Active Hospital Problems    Paranoia (H)      *Mood disorder (H24)    F39    Elton Rodas, [facility]   Licensed Mental Health Professional (LMHP), [facility]  [phone]

## 2024-07-04 NOTE — ED NOTES
Patient educated about the importance of psychotherapy and outpatient follow-up care. Patient is reluctant and anxious about an in-patient stay and engages in planning for follow-up with primary psych care providers in [place] post hospital visit @ [facility].

## 2024-07-04 NOTE — ED PROVIDER NOTES
[facility] ED Mental Health Handoff Note:       Brief HPI:  This is a 53 year old male signed out to me by Dr. Don.  See initial ED Provider note for full details of the presentation.    Home meds reviewed and ordered/administered: Yes    Medically stable for inpatient mental health admission: Yes.    Evaluated by mental health: Yes. The recommendation is for inpatient mental health treatment. Bed search in process    Safety concerns: At the time I received sign out, there were no safety concerns.    Hold Status:  Active Orders   N/A         Exam:   Patient Vitals for the past 24 hrs:   BP Temp Temp src Pulse Resp SpO2   07/04/24 0815 (!) 144/93 98  F (36.7  C) Oral 68 16 100 %   07/03/24 2033 134/78 -- -- 60 -- 100 %         ED Course:    Medications   acetaminophen (TYLENOL) tablet 650 mg (has no administration in time range)   ibuprofen (ADVIL/MOTRIN) tablet 600 mg (has no administration in time range)   hydrOXYzine HCl (ATARAX) tablet 25-50 mg (50 mg Oral $Given 7/3/24 0917)   OLANZapine zydis (zyPREXA) ODT tab 10 mg (10 mg Oral $Given 7/3/24 2202)   zolpidem (AMBIEN) tablet 10 mg (10 mg Oral $Given 7/3/24 2202)   OLANZapine zydis (zyPREXA) ODT tab 10 mg (10 mg Oral $Given 7/2/24 2301)   LORazepam (ATIVAN) tablet 1 mg (1 mg Oral $Given 7/3/24 0025)   LORazepam (ATIVAN) tablet 2 mg (2 mg Oral $Given 7/3/24 1308)            There were no significant events during my shift.        Impression:    ICD-10-CM    1. Mood disorder (H24)  F39       2. Paranoia (H)  F22           Plan:    Awaiting inpatient mental health admission/transfer.      RESULTS:   No results found for this visit on 07/02/24 (from the past 24 hour(s)).          MD Kay MCCLELLAN Andrew R, MD  07/04/24 8324

## 2024-07-04 NOTE — TELEPHONE ENCOUNTER
R: [place]  Access Inpatient Bed Call Log  7/4/2024 12:02 AM  Intake has called facilities that have not updated their bed status within the last 12 hours.??      ADULTS:     *METRO  [place] -- [facility]: @ cap per website.  [place] -- [facility]:  @ Cap per website.    [place] -- [facility]: @ Cap per website.  [place] -- [facility]: @ Cap per website.   [place] -- [facility]: @ Cap per website.  [facility] -- [facility]: @ Cap per website.   [place] -- [facility]/YA beds @ Posting 4 beds. Ages 18-28, Voluntary only, COVID test req'd, NO aggression, physical or sexual assault, violence hx or drug abuse, or psychosis   [place] -- [facility]: @ Cap per website.  [place] -- [facility]: @ cap per website.  [place] -- [facility]:  @ Cap per website.      Pt remains on waitlist pending appropriate placement availability.

## 2024-07-04 NOTE — PROGRESS NOTES
"Triage & Transition Services, Extended Care     Therapy Progress Note    Patient: Ryan goes by \"Ryan,\" uses he/him pronouns  Date of Service: July 3, 2024  Site of Service: [facility] EMERGENCY DEPARTMENT                             ED05  Patient was seen yes  Mode of Assessment:      Presentation Summary: Patient is cooperative, engages easily in iPad visit with this writer.  Continues to present with high levels of anxiety, reports that he has been compliant with medications that have been offered, and has been taking lorazepam today.  Feels as though it may be too high of a dose, is experiencing some foggy or thinking, feels like his speech is less clear.  Does report improvement specific to anxiety, not feeling as if his heart is racing, slowing racing thoughts as well.  Patient was able to sleep overnight, believes he got approximately 7 hours.  Additionally was able to nap some today.  Reports a willingness to come inpatient, however also wants to consider possible continued observation with new medication and possible discharge tomorrow afternoon.  Patient's wife and adult children are supportive and engaged, or working with patient to help him understand current programming and changes.  Able to engage in identifying some safety planning, and coping skills.  Patient and spouse report that patient has experienced some lack of strength due to recent weight loss, also feeling coordination is off, is quick to tire.    Therapeutic Intervention(s) Provided: Engaged in safety planning, Engaged in cognitive restructuring/ reframing, looked at common cognitive distortions and challenged negative thoughts., Reviewed healthy living that supports positive mental health, including looking at sleep hygiene, regular movement, nutrition, and regular socialization.    Current Symptoms: racing thoughts, excessive worry, anxious, shortness of breath or racing heart impaired decision making, difficulty " concentrating racing thoughts, excessive worry, anxious, shortness of breath or racing heart inattentive      Mental Status Exam   Affect: Constricted  Appearance: Appropriate  Attention Span/Concentration: Attentive  Eye Contact: Engaged    Fund of Knowledge: Appropriate   Language /Speech Content: Fluent  Language /Speech Volume: Normal  Language /Speech Rate/Productions: Pressured, Normal  Recent Memory: Variable  Remote Memory: Variable  Mood: Anxious, Depressed  Orientation to Person: Yes   Orientation to Place: Yes  Orientation to Time of Day: Yes  Orientation to Date: Yes     Situation (Do they understand why they are here?): Yes  Psychomotor Behavior: Other (please comment) (Agitated, pacing, restless)  Thought Content: Paranoia  Thought Form: Obsessive/Perseverative, Goal Directed    Treatment Objective(s) Addressed: rapport building, identifying and practicing coping strategies, processing feelings, safety planning, assessing safety    Patient Response to Interventions: acceptance expressed, verbalizes understanding    Progress Towards Goals: Patient Reports Symptoms Are: ongoing  Patient Progress Toward Goals: is making progress  Comment: Patient reports he was able to sleep approximately 7 hours overnight, has been able to nap a bit today.  Continues to experience high levels of anxiety, does report some improvement with lorazepam, does feel somewhat drugged with current dose., has also met with psychiatry today and will start olanzapine with Ambien tonight for sleep.  Next Step to Work Toward Discharge: symptom stabilization  Symptom Stabilization Comment: Reduction of anxiety, improved sleep,    Case Management:      Plan: inpatient mental health  yes RN, provider    yes    Clinical Substantiation: Patient presented to the emergency department due to significant increase in anxiety, ongoing [id number] even with medication, difficulty addressing day-to-day responsibilities, feeling as if he  is letting his team at work down as he is not able to perform.  Patient remains appropriate for inpatient hospitalization, is agreeable to remaining in the emergency department overnight and into late afternoon tomorrow, will be starting new medication regimen today.  Patient remains on the inpatient placement list, is voluntary.  Patient is not reporting SI, HI, SIB and is not presenting as a danger to himself or others.  Patient is not holdable if he were to elect to discharge.    Legal Status: Legal Status at Admission: Voluntary/Patient has signed consent for treatment    Session Status: Time session started: 0416  Time session ended: 0432  Session Duration (minutes): 16 minutes  Session Number: 1  Anticipated number of sessions or this episode of care: 4    Time Spent: 16 minutes    CPT Code: CPT Codes: 28899 - Psychotherapy (with patient) - 30 (16-37*) min    Diagnosis:   Patient Active Problem List   Diagnosis Code    Paranoia (H) F22    Mood disorder (H24) F39       Primary Problem This Admission: Active Hospital Problems    *Paranoia (H)      Mood disorder (H24)        [facility]   Licensed Mental Health Professional (LMHP), [facility]  [phone]

## 2024-07-04 NOTE — PROGRESS NOTES
Patient's RN in the [facility] said that the patient and his sister are both asking for a re-evaluation urgently so that he can discharge with a prescription for a medication that will resolve the sleep issue that appears to have been ongoing for at least 6 weeks. Reviewed his chart - he has tried mirtazapine 15 and 30 mg, trazodone 100 mg, and zolpidem 30 mg. Mirtazapine is more sedating at lower doses, but it does not appear that he has tried 7.5 mg. It does not appear that he has tried higher doses of trazodone. Patient is reported to be highly anxious.    BP is elevated at 152/87. With the elevated BP, severe anxiety, and insomnia, I recommended clonidine extended release (Kapvay) 0.1 mg BID.      Will hold off on adding any more medications at this time and see how he does tonight with the addition of Kapvay. He is already on 3 CNS depressants. It appears that he did sleep for 7 hours last night.     Other options: mirtazapine 7.5 mg OR doxepin 3 mg OR discontinue Zyprexa and switch to Seroquel OR amitriptyline OR discontinue Ambien and switch to Ramelteon.    Obtain baseline EKG  Consult sleep medicine.      ADELIA Garrison, CNP  Emergency Psychiatry  7/4/2024

## 2024-07-04 NOTE — TELEPHONE ENCOUNTER
R: [place]  Access Inpatient Bed Call Log 7/3/2024 11:15 PM   Intake has called facilities that have not updated their bed status within the last 12 hours.??         [facility]   [facility]: @ cap per website.   [facility] -- [facility]:  Posting 0 beds. [phone] ECT Tx offered. Per previous calls, patients need to go through APS.   [facility] -- [facility]: Posting 0 beds. ECT Tx offered.        [facility] -- [facility]:  Posting 0 beds.   [facility] -- [facility]: Posting 0 beds. [phone] ECT Tx offered.   [place] -- [facility] 4 YA beds. Pt must be 16-28 Pt is not appropriate.  [place] -- [facility] Posting 0 beds. [phone]     [facility] -- [facility]: @ cap per website.   [place] -- [facility]:  Posting 0 beds. Low acuity only. [phone]      Pt remains on worklist pending appropriate bed availability.

## 2024-07-04 NOTE — ED NOTES
Patient not agreeable to unit routine at [facility] and complains of anxiety. Patient given lunch and is watching television and reading the paper at this time.

## 2024-07-04 NOTE — ED NOTES
Bed: BEC02R  Expected date: 7/4/24  Expected time:   Means of arrival:   Comments:  ROSSI ALMEIDA

## 2024-07-05 ENCOUNTER — TELEPHONE (OUTPATIENT)
Dept: BEHAVIORAL HEALTH | Facility: CLINIC | Age: 54
End: 2024-07-05
Payer: COMMERCIAL

## 2024-07-05 VITALS
RESPIRATION RATE: 18 BRPM | WEIGHT: 172.3 LBS | DIASTOLIC BLOOD PRESSURE: 78 MMHG | TEMPERATURE: 97.6 F | SYSTOLIC BLOOD PRESSURE: 113 MMHG | HEART RATE: 60 BPM | BODY MASS INDEX: 22.83 KG/M2 | OXYGEN SATURATION: 99 % | HEIGHT: 73 IN

## 2024-07-05 PROCEDURE — 250N000013 HC RX MED GY IP 250 OP 250 PS 637: Performed by: CLINICAL NURSE SPECIALIST

## 2024-07-05 PROCEDURE — 99284 EMERGENCY DEPT VISIT MOD MDM: CPT

## 2024-07-05 RX ORDER — HYDROXYZINE PAMOATE 50 MG/1
50 CAPSULE ORAL 3 TIMES DAILY PRN
Qty: 30 CAPSULE | Refills: 0 | Status: SHIPPED | OUTPATIENT
Start: 2024-07-05

## 2024-07-05 RX ORDER — ZOLPIDEM TARTRATE 10 MG/1
10 TABLET ORAL AT BEDTIME
Qty: 15 TABLET | Refills: 0 | Status: SHIPPED | OUTPATIENT
Start: 2024-07-05

## 2024-07-05 RX ORDER — CLONIDINE HYDROCHLORIDE 0.1 MG/1
0.1 TABLET, EXTENDED RELEASE ORAL 2 TIMES DAILY
Qty: 30 TABLET | Refills: 0 | Status: SHIPPED | OUTPATIENT
Start: 2024-07-05

## 2024-07-05 RX ORDER — OLANZAPINE 10 MG/1
10 TABLET ORAL AT BEDTIME
Qty: 15 TABLET | Refills: 0 | Status: SHIPPED | OUTPATIENT
Start: 2024-07-05

## 2024-07-05 RX ADMIN — CLONIDINE 0.1 MG: 0.1 TABLET, EXTENDED RELEASE ORAL at 08:23

## 2024-07-05 ASSESSMENT — ACTIVITIES OF DAILY LIVING (ADL)
ADLS_ACUITY_SCORE: 35

## 2024-07-05 ASSESSMENT — COLUMBIA-SUICIDE SEVERITY RATING SCALE - C-SSRS
1. SINCE LAST CONTACT, HAVE YOU WISHED YOU WERE DEAD OR WISHED YOU COULD GO TO SLEEP AND NOT WAKE UP?: NO
SUICIDE, SINCE LAST CONTACT: NO
TOTAL  NUMBER OF INTERRUPTED ATTEMPTS SINCE LAST CONTACT: NO
ATTEMPT SINCE LAST CONTACT: NO
TOTAL  NUMBER OF ABORTED OR SELF INTERRUPTED ATTEMPTS SINCE LAST CONTACT: NO
2. HAVE YOU ACTUALLY HAD ANY THOUGHTS OF KILLING YOURSELF?: NO
6. HAVE YOU EVER DONE ANYTHING, STARTED TO DO ANYTHING, OR PREPARED TO DO ANYTHING TO END YOUR LIFE?: NO

## 2024-07-05 NOTE — CONSULTS
Ryan Guerrero MRN# [id number]   Age: 53 year old YOB: 1970   Date of Admission to ED: 7/2/2024    In person visit Details:     Patient was assessed and interviewed face-to-face in person with this writer jack. Patient was observed to be able to participate in the assessment as evidenced by verbal consent. Assessment methods included conducting a formal interview with patient, review of medical records, collaboration with medical staff, and obtaining relevant collateral information from family and community providers when available.        Reason for Consult:   This note is being entered to supplement the psychiatry consultation note that was completed on July 02 2024 by the licensed mental health professional Rosanne Haji, [facility]  have reviewed the pertinent clinical details related to their encounter. I am being consulted to offer additional guidance on psychiatric pharmacological interventions     I met Jj in his room face-to-face by himself he is was very pleasant and cooperative during assessment and interview currently denied any suicidal ideation or homicidal ideation or self-injury behavior.  Per nurses notes Jj has been sleeping very well, also Jj told me he is sleep better than before but he rather sleep in his own bedroom and uses by his room.  Jj started on Zyprexa 10 mg while staying in the emergency room, continue Ambien 10 mg.  Also we started Jj on clonidine 0.1 mg twice a day for his current anxiety.  Currently Jj have some tremor can be from side effect of Zyprexa explained to the family both his wife and his sister was at bedside wanted to take him to [place] today.  I refilled his medication Zyprexa 10 mg Ambien 10 milligrams daily and clonidine 0.1 mg was given risk and benefit of all this medication was explained to Jj and his family.  Specifically I explained to all the family about risk and the benefit of Zyprexa.  Family told me  Jj will have psychiatry to follow[place] in [place] also he will follow-up with outpatient partial hospitalization when he from today.  Also I gave Jj 50 mg hydroxyzine as needed for an anxiety.  I explained to both sister and his wife Jj needed  more psychotherapy to manage his current distress.      There is genetic loading for none known.  Medical history does not appear to be significant.  Substance use does not appear to be playing a contributing role in the patient's presentation.  Patient appears to cope with stress/frustration/emotion by withdrawing.  Stressors include chronic mental health issues, school issues, peer issues, and family dynamics.      I have reviewed the nursing notes. I have reviewed the findings, diagnosis, plan and need for follow up with the patient.         HPI:      Per ED provider Dr. Arango note Ryan Guerrero is a 53 year old male who with no reported previous past medical history who presents to the emergency department today due to difficulty sleeping.  Patient presents with his wife who gives some of the history.  Apparently for the past 6 weeks he has had issues with sleeping to the point where he is only sleeping about 2 hours per night.  He is having difficulty falling asleep and staying asleep.  He is awake all night and pacing around.  This is very unusual for him.  He has tried multiple medications which were prescribed through his primary clinic as well as through his sleep specialist and has tried trazodone, mirtazapine, and is currently on 30 mg of Ambien at bedtime which has been ineffective for him.  Patient had an appointment with a psychiatrist via telehealth today and the psychiatrist prescribed another medication for him, unfortunately they do not know what the name of the medication is, he has not yet picked it up and has not yet started it (eventually it was discovered that this was zyprexa).  His wife reports that for the past week, he has  "gotten progressively worse and is deteriorating to the point where he cannot function.  He has lost about 15 pounds over the past few weeks due to not eating.  He reports severe anxiety, difficulty calming down, and racing thoughts.  He denies any SI, HI, or hallucinations.  Denies any alcohol or drug use.  He states that just prior to these symptoms starting several weeks ago he \"had a full checkup with my primary and all blood work was normal.\"     Patient lives in [place] normally, but again they recently purchased a second home in [place] and they are living here for the summer.  This has been somewhat stressful for him.  He also makes vague references to some family stressors and made reference to a \"bad business transaction with a family member that went south.\"  He does not give further details other than this.     Patient has had significant concern about his work.  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  He has had significant difficulty concentrating which she is only exacerbating his inability to finish his project.  He is hyper fixated on this.     Evidently today during his telehealth psychiatry visit, this psychiatrist suggested to him that he take short-term disability/FMLA from work and take a medical leave due to his significantly debilitating symptoms.  Patient is very concerned about doing this as the patient does not want to miss work.     Patient came here from [place] today at the encouragement of his adult daughter who is a nurse.       Patient also is reporting that he had an episode of blood in stool today.  He has had issues with hard stools/constipation and did have to strain today.  He noticed a little bit of blood on top of the stool as well as blood on the paper.  He denies any rectal pain and has not had bleeding since that time.  He denies any abdominal " pain, nausea, or vomiting.  He wonders if he could have a hemorrhoid that may be causing this for him.  Reports that he had one previous episode of this many years ago and has not had any issues since that time.     Patient denies fevers or chills, nasal congestion or sore throat, no cough.  He does have some anxiety and shortness of breath and sometimes some reflux/heartburn type symptoms.     This part of the medical record was transcribed by JANINE ORNELAS, Medical Scribe, from a dictation done by Pat Arango MD.         Pt has not required locked seclusion or restraints in the past 24 hours to maintain safety, please refer to RN documentation for further details.  Substance use does not appear to be playing a contributing role in the patient's presentation.  Brief Therapeutic Intervention(s):   Provided active listening, unconditional positive regard, and validation. Engaged in cognitive restructuring/ reframing, looked at common cognitive distortions and challenged negative thoughts. Engaged in guided discovery, explored patient's perspectives and helped expand them through socratic dialogue. Provided positive reinforcement for progress towards goals, gains in knowledge, and application of skills previously taught.  Engaged in social skills training. Explored and identified early warning signs to anger.        Past Psychiatric History:     See DEC  note        Substance Use and History:     See DEC  note         Past Medical History:   PAST MEDICAL HISTORY: No past medical history on file.    PAST SURGICAL HISTORY: No past surgical history on file.            Allergies:     Allergies   Allergen Reactions    Cats     Seasonal Allergies              Medications:   I have reviewed this patient's current medications  Current Facility-Administered Medications   Medication Dose Route Frequency Provider Last Rate Last Admin    acetaminophen (TYLENOL) tablet 650 mg  650 mg Oral Q4H SHE Arango  "Pat Enriquez MD        CloNIDine ER (KAPVAY) 12 hr tablet TB12 0.1 mg  0.1 mg Oral BID Tran Hamilton APRN CNP   0.1 mg at 07/05/24 0823    hydrOXYzine HCl (ATARAX) tablet 25-50 mg  25-50 mg Oral Q6H PRN Pat Arango MD   50 mg at 07/04/24 1744    ibuprofen (ADVIL/MOTRIN) tablet 600 mg  600 mg Oral Q6H PRN Pat Arango MD        OLANZapine zydis (zyPREXA) ODT tab 10 mg  10 mg Oral At Bedtime Yung Santana MD   10 mg at 07/04/24 2206    zolpidem (AMBIEN) tablet 10 mg  10 mg Oral At Bedtime Royal Perkins APRN CNP   10 mg at 07/04/24 2206     Current Outpatient Medications   Medication Sig Dispense Refill    CloNIDine ER (KAPVAY) 0.1 MG 12 hr tablet Take 1 tablet (0.1 mg) by mouth 2 times daily 30 tablet 0    hydrOXYzine (VISTARIL) 50 MG capsule Take 1 capsule (50 mg) by mouth 3 times daily as needed for itching 30 capsule 0    OLANZapine (ZYPREXA) 10 MG tablet Take 1 tablet (10 mg) by mouth at bedtime 15 tablet 0    zolpidem (AMBIEN) 10 MG tablet Take 1 tablet (10 mg) by mouth at bedtime 15 tablet 0              Family History:   FAMILY HISTORY: No family history on file.           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       - Collateral information from the famly/friend: none         PTA Medications:   (Not in a hospital admission)         Allergies:     Allergies   Allergen Reactions    Cats     Seasonal Allergies           Labs:   No results found for this or any previous visit (from the past 48 hour(s)).       Physical and Psychiatric Examination:     BP (!) 151/93   Pulse 60   Temp 97.6  F (36.4  C) (Oral)   Resp 16   Ht 1.854 m (6' 1\")   Wt 78.2 kg (172 lb 4.8 oz)   SpO2 99%   BMI 22.73 kg/m    Weight is 172 lbs 4.8 oz  Body mass index is 22.73 kg/m .    Mental Status Exam:  Appearance: awake, alert  Attitude:  cooperative  Eye Contact:  good  Mood:  anxious, sad , and depressed  Affect:  appropriate " and in normal range  Speech:  clear, coherent  Language: fluent and intact in English  Psychomotor, Gait, Musculoskeletal:  no evidence of tardive dyskinesia, dystonia, or tics  Thought Process:  logical, linear, and goal oriented  Associations:  no loose associations  Thought Content:  no evidence of suicidal ideation or homicidal ideation  Insight:  good  Judgement:  intact  Oriented to:  time, person, and place  Attention Span and Concentration:  intact  Recent and Remote Memory:  intact  Fund of Knowledge:  appropriate         Diagnoses:      Mood disorder (H24)  Paranoia (H)  Insomnia, unspecified type         Recommendations:         1.  Discharge back to home with family patient will follow up with in [place] with partial hospitalization and therapy and psychiatry  2.  Zyprexa 10 mg, Ambien 10 mg, clonidine 0.1 mg twice a day hydroxyzine 50 mg every 8 hours  4.  Consult psychiatry as needed  4.   Refer to psychiatric provider for medication management. *   treatment per ED team    - [facility]  licensed mental health professional, ED physician Dr. Key asked if they would like this writer to enter orders in the EHR,  patient's ED RN regarding this case.    Please call [facility]/DEC at [phone] if you have follow-up questions or wish to place another consult.  Royal Perkins, Psychiatric Nurse practitioner    Attestation:  Time with:  Patient:/Family  30 minutes  Treatment Team: 30 Minutes  Chart Review: 30 minutes    Total time spent was 90 minutes. Over 50% of times was spent counseling and coordination of care.    I thank  primary ED provider and extended care team very much for letting me participate in the care of this patient.    I, Royal Perkins, FANTA, APRN, Psychiatric Nurse Practitioner have personally performed an examination of this patient.  I have edited the note to reflect all relevant changes.  I have discussed this patient with the care team July 5, 2024.  I have  reviewed all vitals and laboratory findings.    Disclaimer: This note consists of symbols derived from keyboarding,

## 2024-07-05 NOTE — ED NOTES
Patient was anxious but calm and cooperative.ATARAX 50MG PRN was given for anxiety.Patient was medications compliant with a lot of teaching by this writer.Patient denied SI/HI. His family was here visiting.Patient is resting at this moment.Will continue to monitor.

## 2024-07-05 NOTE — PROGRESS NOTES
"Triage and Transition Services Extended Care Reassessment     Patient: Ryan goes by \"Ryan,\" uses he/him pronouns  Date of Service: July 5, 2024  Site of Service: [facility] EMERGENCY DEPARTMENT                             [id number]  Patient was seen yes  Mode of Assessment: In person     Reason for Reassessment: other (see comment) (anxiety)    History of Patient's Original Emergency Room Encounter: Patient denied prior mental health diagnosis.  He has a Depression diagnosis on his record from [place].    Current Patient Presentation: This writer introduced self and role to patient who was sitting in his room reading the newspaper.  Patient presented as engaged, cooperative, oriented x4, and able to engage in safety planning.  Patient disclosed that his mental health crisis began 6 - 8 weeks ago when he began struggling with worry, racing thoughts and insomnia.  He stated that the lack of sleep precipitated his anxiety, paranoia and weight loss.  He denied ever attempted suicide or engaging in SIB.  Patient has never been hospitalized for mental health and has a psychiatrist in [place].  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  He currently is on a leave from work to focus on his mental health.    Presentation Summary: Pt is seen by this clinician for a reassessment since the ED [place] seemed to intensify his anxiety due to the noises.   Pt presented as axious, cooperative, alert, oriented x4.   Affect and eye contact are appropriate. Thought processes are organized and logical. Pt denies SI, HI, and AVH's. Patient reported that he has had difficulty resting and sleeping in ED, and requested to discharge home to [place] where he resides.    Changes Observed Since Initial Assessment: decrease in presenting symptoms    Therapeutic Interventions Provided: Engaged in safety planning, Engaged in cognitive restructuring/ reframing, looked at " common cognitive distortions and challenged negative thoughts., Reviewed healthy living that supports positive mental health, including looking at sleep hygiene, regular movement, nutrition, and regular socialization.    Current Symptoms: racing thoughts, excessive worry, anxious, shortness of breath or racing heart impaired decision making, difficulty concentrating racing thoughts, excessive worry, anxious, shortness of breath or racing heart distractability loss of appetite    Mental Status Exam   Affect: Appropriate  Appearance: Appropriate  Attention Span/Concentration: Attentive  Eye Contact: Engaged    Fund of Knowledge: Advanced   Language /Speech Content: Fluent  Language /Speech Volume: Normal  Language /Speech Rate/Productions: Normal  Recent Memory: Intact  Remote Memory: Intact  Mood: Anxious  Orientation to Person: Yes   Orientation to Place: Yes  Orientation to Time of Day: Yes  Orientation to Date: Yes     Situation (Do they understand why they are here?): Yes  Psychomotor Behavior: Normal  Thought Content: Clear  Thought Form: Obsessive/Perseverative, Goal Directed    Treatment Objective(s) Addressed: rapport building, identifying and practicing coping strategies, processing feelings, safety planning, assessing safety, identifying an appropriate aftercare plan, building distress tolerance, identifying treatment goals, identifying additional supports, orienting the patient to therapy    Patient Response to Interventions: acceptance expressed, verbalizes understanding    Progress Towards Goals:  Patient Reports Symptoms Are: improving  Patient Progress Toward Goals: is making progress  Comment: Patient reports he was able to sleep approximately 7 hours overnight, has been able to nap a bit today.  Continues to experience high levels of anxiety, does report some improvement with lorazepam, does feel somewhat drugged with current dose., has also met with psychiatry today and will start olanzapine with  Ambien tonight for sleep.  Next Step to Work Toward Discharge: symptom stabilization, patient ability to engage in safety planning, engaging in safety planning with collateral sources, collaboration with OP team/family/friends  Symptom Stabilization Comment: Reduction of anxiety, improved sleep,    Case Management:      C-SSRS Since Last Contact:   1. Wish to be Dead (Since Last Contact): No  2. Non-Specific Active Suicidal Thoughts (Since Last Contact): No     Actual Attempt (Since Last Contact): No  Has subject engaged in non-suicidal self-injurious behavior? (Since Last Contact): No  Interrupted Attempts (Since Last Contact): No  Aborted or Self-Interrupted Attempt (Since Last Contact): No  Preparatory Acts or Behavior (Since Last Contact): No  Suicide (Since Last Contact): No     Calculated C-SSRS Risk Score (Since Last Contact): No Risk Indicated    Plan: Final Disposition / Recommended Care Path: discharge  Plan for Care reviewed with assigned Medical Provider: yes  Plan for Care Team Review: provider, RN  Comments: Patient requested to discharge home with his sister, Charlene.  Psychiatric provider recommended discharge with medication adjustment.  Patient and/or validated legal guardian concurs: yes  Clinical Substantiation: After reassessment, patient requested to discharge back to [place] along with his sister, Charlene, [phone].  Patient expressed that he could engage in safety planning and he denied SI/HI plan/intent and did not appear to be responding to internal stimuli.  Patient continued to endorse anxiety and concerns for his safety in the ED as the patient next to his room was acting disruptive and banging on the door which heightened patient's anxiety.  The psychiatric provider changed his medications so that patient could relax and resume a normal sleep pattern.  This clinician supports discharge with services in place and I do not believe that patient warrants inpatient mental health treatment at  this time since he has been in ED for 3 days and his medications have been adjusted.  Patient would not benefit from a more restrictive mental health treatment [place] since he has contracted for safety and his mental health crisis has resolved itself in that patient is less anxious and able to contract for safety and engage in safety planning.    Legal Status: Legal Status at Admission: Voluntary/Patient has signed consent for treatment    Session Status: Time session started: 1015  Time session ended: 1035  Session Duration (minutes): 20 minutes  Session Number: 3  Anticipated number of sessions or this episode of care: 3    Session Start Time: 1015  Session Stop Time: 1035  CPT codes: 02020 - Psychotherapy (with patient) - 30 (16-37*) min  Time Spent: 20 minutes      CPT code(s) utilized: 35569 - Psychotherapy (with patient) - 30 (16-37*) min    Diagnosis:   Patient Active Problem List   Diagnosis Code    Paranoia (H) F22    Mood disorder (H24) F39       Primary Problem This Admission: Active Hospital Problems    Paranoia (H)      *Mood disorder (H24)        JUAN LUIS Shafer   Licensed Mental Health Professional (LMHP), [facility]  [phone]

## 2024-07-05 NOTE — TELEPHONE ENCOUNTER
R:   11:01 AM Received a call from psych consult Sasha who states pt will be discharging and can be removed from [facility] work list. PPS following informed.

## 2024-07-05 NOTE — ED NOTES
10:55 pm I received report and will take over care.Patient resting with lights off chest rise and fall observed.23:27 pm patient resting eyes closed chest rise and fall observed 2:00 am patient sleeping with eyes closed chest tamera and fall observed.6:34 am  slept all night long no issues. Patient can voice his needs .

## 2024-07-05 NOTE — TELEPHONE ENCOUNTER
R: [place]  [place] Inpatient [place] Call Log 7/4/2024 8:45 PM   Intake has called facilities that have not updated their bed status within the last 12 hours.??         [facility]   [place] -- [facility]: @ cap per website.   [place] -- [facility]:  Posting 0 beds. [phone] ECT Tx offered. Per previous calls, patients need to go through APS.   [place] -- [facility]: Posting 0 beds. ECT Tx offered.        [place] -- [facility]:  Posting 0 beds.   [facility] -- [facility]: Posting 2 beds. [phone] ECT Tx Per [place] @ 3:20 PM, no beds this evening.  [place] -- [facility] 4 YA beds. Pt must be 16-28 Pt is not appropriate.  [place] -- [facility] Posting 0 beds. [phone]     [facility] -- [facility]: @ cap per website.   [place] -- [facility]:  Posting 0 beds. Low acuity only. [phone]      Pt remains on worklist pending appropriate bed availability.

## 2024-07-05 NOTE — TELEPHONE ENCOUNTER
R:      11:01 AM Received a call from psych consult Sasha who states pt will be discharging and can be removed from IP work list. PPS following informed.     Intake notes pt removed from active placement [facility]. Intake will no longer follow for IP  admission.

## 2024-07-05 NOTE — SUBJECTIVE & OBJECTIVE
Ryan Guerrero MRN# [id number]   Age: 53 year old YOB: 1970   Date of Admission to ED: 7/2/2024    In person visit Details:     Patient was assessed and interviewed face-to-face in person with this writer a. Patient was observed to be able to participate in the assessment as evidenced by verbal consent. Assessment methods included conducting a formal interview with patient, review of medical records, collaboration with medical staff, and obtaining relevant collateral information from family and community providers when available.        Reason for Consult:   This note is being entered to supplement the psychiatry consultation note that was completed on ***** by the licensed mental health professional **** have reviewed the pertinent clinical details related to their encounter. I am being consulted to offer additional guidance on psychiatric pharmacological interventions    There is genetic loading for none**** known.  Medical history does not appear to be significant.  Substance use does not**** appear to be playing a contributing role in the patient's presentation.  Patient appears to cope with stress/frustration/emotion by withdrawing.  Stressors include chronic mental health issues, school issues, peer issues, and family dynamics.  Patient's support system includes family.Protective factors: family Risk factors: maladaptive coping, school issues***** peer issues, family dynamics, and past behaviors. Patient Strengths:       I have reviewed the nursing notes. I have reviewed the findings, diagnosis, plan and need for follow up with the patient.         HPI:     ***         Pt has *****not required locked seclusion or restraints in the past 24 hours to maintain safety, please refer to RN documentation for further details.  Substance use does ****not appear to be playing a contributing role in the patient's presentation.*****  Brief Therapeutic Intervention(s): *****  Provided active listening,  "unconditional positive regard, and validation. Engaged in cognitive restructuring/ reframing, looked at common cognitive distortions and challenged negative thoughts. ******Engaged in guided discovery, explored patient's perspectives and helped expand them through socratic dialogue. Provided positive reinforcement for progress towards goals, gains in knowledge, and application of skills previously taught.  Engaged in social skills training. Explored and identified early warning signs to anger******        Past Psychiatric History:     ***        Substance Use and History:     ***        Past Medical History:   PAST MEDICAL HISTORY: No past medical history on file.    PAST SURGICAL HISTORY: No past surgical history on file.            Allergies:     Allergies   Allergen Reactions    Cats     Seasonal Allergies              Medications:   { :5236153}           Family History:   FAMILY HISTORY: No family history on file.           Social History:   Upbringing: born and raised ***  Educational History:   Relationships:***  Children: **   Current Living Situation:***   Occupational History: ****  Financial Support: limited  Legal History: ****  Abuse/Trauma History: *****       - Collateral information from the famly/friend: none         PTA Medications:   (Not in a hospital admission)         Allergies:     Allergies   Allergen Reactions    Cats     Seasonal Allergies           Labs:   No results found for this or any previous visit (from the past 48 hour(s)).       Physical and Psychiatric Examination:     BP (!) 151/93   Pulse 60   Temp 97.6  F (36.4  C) (Oral)   Resp 16   Ht 1.854 m (6' 1\")   Wt 78.2 kg (172 lb 4.8 oz)   SpO2 99%   BMI 22.73 kg/m    Weight is 172 lbs 4.8 oz  Body mass index is 22.73 kg/m .    Mental Status Exam:  Appearance: { :852977}  Attitude:  { :652721}  Eye Contact:  { :085375}  Mood:  { :805015}  Affect:  { :813114}  Speech:  { :257479}  Language: fluent and intact in " English  Psychomotor, Gait, Musculoskeletal:  { :113087}  Thought Process:  { :526059}  Associations:  { :388628}  Thought Content:  { :285575}  Insight:  { :495240}  Judgement:  { :635334}  Oriented to:  { :704960}  Attention Span and Concentration:  { :461058}  Recent and Remote Memory:  { :234631}  Fund of Knowledge:  { :461900}         Diagnoses:      Mood disorder (H24)  Paranoia (H)  Insomnia, unspecified type         Recommendations:         1.*    4.  Consult psychiatry as needed  4.   Refer to psychiatric provider for medication management. ****   treatment per ED team    - Consulted with ***Extended Care  licensed mental health professional, ED physician*** asked if they would like this writer to enter orders in the EHR,  patient's ED RN regarding this case.    Please call [facility] at [phone] if you have follow-up questions or wish to place another consult.  Royal Perkins, Psychiatric Nurse practitioner    Attestation:  Time with:  Patient: *** minutes  Treatment Team: *** Minutes  Chart Review: *** minutes    Total time spent was *** minutes. Over 50% of times was spent counseling and coordination of care.    I thank  primary **** care team very much for letting me participate in the care of this patient.    I, Royal Perkins, CNP, APRN, Psychiatric Nurse Practitioner have personally performed an examination of this patient.  I have edited the note to reflect all relevant changes.  I have discussed this patient with the care team ***/***/***.  I have reviewed all vitals and laboratory findings.    Disclaimer: This note consists of symbols derived from keyboarding,

## 2024-07-05 NOTE — ED PROVIDER NOTES
[facility] ED Mental Health Handoff Note:       Brief HPI:  This is a 53 year old male signed out to me by Dr. Zavala.  See initial ED Provider note for full details of the presentation. Interval history is pertinent for patient slept well last night, reported feeling substantially improved, and requesting discharge.    Home meds reviewed and ordered/administered: Yes    Medically stable for inpatient mental health admission: Yes.    Evaluated by mental health: Yes. The recommendation is for outpatient mental health treatment. Resources and plan given to patient.    Safety concerns: At the time I received sign out, there were no safety concerns.    Hold Status:  Active Orders   N/A            Exam:   Patient Vitals for the past 24 hrs:   BP Temp Temp src Pulse Resp SpO2   07/04/24 1900 (!) 151/93 -- -- -- -- 99 %   07/04/24 1700 (!) 144/92 -- -- -- -- 99 %   07/04/24 1120 (!) 152/87 97.6  F (36.4  C) Oral 60 16 100 %       General: Patient is in no acute distress and is resting comfortably.  HEENT: Normocephalic atraumatic  Neck: Supple  Cardiovascular: Heart rate normal  Pulmonary: Patient is in no respiratory distress  Extremities: No signs of any significant or life-threatening trauma.  Neurologic: No new focal neurologic deficits.  Psychiatric:         Attention and Perception: Attention and perception normal. He does not perceive auditory or visual hallucinations.         Mood and Affect: Mood normal.         Speech: Speech normal.         Behavior: Behavior normal. Behavior is not agitated, aggressive, hyperactive or combative. Behavior is cooperative.         Thought Content: Thought content normal. Thought content is not paranoid or delusional. Thought content does not include homicidal or suicidal ideation.         Cognition and Memory: Cognition and memory normal.         Judgment: Judgment normal.      ED Course:    Medications   acetaminophen (TYLENOL) tablet 650 mg (has no administration in time  range)   ibuprofen (ADVIL/MOTRIN) tablet 600 mg (has no administration in time range)   hydrOXYzine HCl (ATARAX) tablet 25-50 mg (50 mg Oral $Given 7/4/24 1744)   OLANZapine zydis (zyPREXA) ODT tab 10 mg (10 mg Oral $Given 7/4/24 2206)   zolpidem (AMBIEN) tablet 10 mg (10 mg Oral $Given 7/4/24 2206)   CloNIDine ER (KAPVAY) 12 hr tablet TB12 0.1 mg (0.1 mg Oral $Given 7/5/24 0823)   OLANZapine zydis (zyPREXA) ODT tab 10 mg (10 mg Oral $Given 7/2/24 2301)   LORazepam (ATIVAN) tablet 1 mg (1 mg Oral $Given 7/3/24 0025)   LORazepam (ATIVAN) tablet 2 mg (2 mg Oral $Given 7/3/24 1308)            There were significant events during my shift.  The patient requested to be reassessed as he felt more stable and able to be discharged, and so was also seen by the psychiatry consult service and extended [facility] , please refer to their extensive note/evaluation which was reviewed with me and is documented in EPIC on 7/5/2024 for further details.  Patient has stabilized significantly.  He was able to sleep several hours last night.  He feels his thoughts are more organized.  He details an outpatient plan.  He is not complaining of any suicidal or homicidal thoughts and does not appear to represent an acute safety risk.  Does not meet grounds for involuntary hold.  He will be discharged as per his request.          Impression:    ICD-10-CM    1. Insomnia, unspecified type  G47.00 zolpidem (AMBIEN) 10 MG tablet      2. Mood disorder (H24)  F39 OLANZapine (ZYPREXA) 10 MG tablet     CloNIDine ER (KAPVAY) 0.1 MG 12 hr tablet     hydrOXYzine (VISTARIL) 50 MG capsule      3. Paranoia (H)  F22 OLANZapine (ZYPREXA) 10 MG tablet          Plan:    Discharged.  We discussed the indications for emergency department return and follow-up.  Stable for discharge.        RESULTS:   No results found for this visit on 07/02/24 (from the past 24 hour(s)).          MD Noel Canas David, MD  07/05/24 1039

## 2024-07-05 NOTE — TELEPHONE ENCOUNTER
R: [facility] [place] Access Inpatient Bed Call Log 7/5/2024 8:09:43 AM    Intake has called facilities that have not updated their bed status within the last 12 hours.    ADULTS:  *[facility] is posting 0 beds.              [facility] is posting 0 beds. [phone], APS: [phone]    [facility] is posting 0 beds. [phone]              [facility] is posting 0 beds. [phone]    [facility] is posting 2 beds. [phone]   [facility] (Ages 18-35) is posting 1 beds. Negative COVID test required, no recent or significant aggression, violence, or sexual assault. [phone]   [facility] is posting 0 beds. [phone]            [facility] is posting 0 beds. [phone]     [facility] through [facility] is posting 0 beds. [phone]        Pt remains on work list pending appropriate bed availability.

## 2024-07-05 NOTE — TELEPHONE ENCOUNTER
R: [place]  [place] Inpatient Bed Call Log  7/5/24 @ 1:00am   Intake has called facilities that have not updated their bed status within the last 12 hours.     *[place]:  [place] -- [facility]: @ capacity.  [facility]: @ cap per website. 1 ICU bed available. Reporting no reviews overnight.  [place] -- [facility]: @ cap per website. Low acuity  [place] -- [facility]: @ cap per website. Low acuity only.  [place] -- [facility]: @ cap per website.  [facility]: POSTING 2 BEDS.   [facility]: POSTING 5 BEDS. Ages 18-35, Voluntary only, NO aggression/physical/sexual assault, violence hx or drug abuse, or psychosis. Negative Covid.   [place] -- [facility]: @ cap per website.  [place] -- [facility]: @ cap per website.  [place] -- [facility]: @ cap per website. Do not review overnight.     Pt remains on waitlist pending appropriate placement availability

## 2024-07-08 ENCOUNTER — TELEPHONE (OUTPATIENT)
Dept: BEHAVIORAL HEALTH | Facility: CLINIC | Age: 54
End: 2024-07-08
Payer: COMMERCIAL

## 2024-07-08 NOTE — TELEPHONE ENCOUNTER
Triage and Transition Services- Patient Follow Up Call  Service Line Making Phone Call: Extended Care    Who did Writer Talk to: Patient    Details of Call: left message with [facility] callback number    Sasha KNOWLES Jessica 7/8/2024 9:18 AM